# Patient Record
Sex: MALE | Race: WHITE | Employment: FULL TIME | URBAN - METROPOLITAN AREA
[De-identification: names, ages, dates, MRNs, and addresses within clinical notes are randomized per-mention and may not be internally consistent; named-entity substitution may affect disease eponyms.]

---

## 2019-10-10 ENCOUNTER — OFFICE VISIT (OUTPATIENT)
Dept: SURGERY | Age: 24
End: 2019-10-10

## 2019-10-10 VITALS
DIASTOLIC BLOOD PRESSURE: 80 MMHG | TEMPERATURE: 98.8 F | HEART RATE: 104 BPM | RESPIRATION RATE: 16 BRPM | OXYGEN SATURATION: 98 % | SYSTOLIC BLOOD PRESSURE: 122 MMHG | WEIGHT: 150 LBS

## 2019-10-10 DIAGNOSIS — R59.0 SUPRACLAVICULAR ADENOPATHY: Primary | ICD-10-CM

## 2019-10-10 RX ORDER — CEPHALEXIN 500 MG/1
CAPSULE ORAL
COMMUNITY
Start: 2019-10-04 | End: 2022-06-13

## 2019-10-10 RX ORDER — LORATADINE 10 MG/1
10 TABLET ORAL
COMMUNITY
End: 2022-06-13 | Stop reason: SDUPTHER

## 2019-10-16 NOTE — PROGRESS NOTES
[facility] History and Physical    Chief Complaint: right supraclavicular adenopathy    History of Present Illness:      Aicha Reis is a 30 y.o. male who was kindly referred from [facility] for evaluation of a right supraclavicular nodule. The patient states his wife actually noted this for the first time a couple of weeks ago. The patient had a 'cold' around that time as well. He palpates a small nodule in the right supraclavicular space along the mid clavicle. He feels like this is new and is concerned about it. He denies any fevers, night sweats or weight loss. He has seasonal allergies. No personal history of cancers. He does have a grandfather that had a lymphoma in the GI tract.       Past Medical History:   Diagnosis Date    Congestion of nasal sinus     Mouth ulcers     history of       Past Surgical History:   Procedure Laterality Date    HX HEENT      tonsils removed       Social History     Socioeconomic History    Marital status:      Spouse name: Not on file    Number of children: Not on file    Years of education: Not on file    Highest education level: Not on file   Occupational History    Not on file   Social Needs    Financial resource strain: Not on file    Food insecurity:     Worry: Not on file     Inability: Not on file    Transportation needs:     Medical: Not on file     Non-medical: Not on file   Tobacco Use    Smoking status: Former Smoker     Last attempt to quit: 1/1/2016     Years since quitting: 3.7    Smokeless tobacco: Never Used   Substance and Sexual Activity    Alcohol use: Yes     Comment: beer 1-2 per week    Drug use: Not on file    Sexual activity: Not on file   Lifestyle    Physical activity:     Days per week: Not on file     Minutes per session: Not on file    Stress: Not on file   Relationships    Social connections:     Talks on phone: Not on file     Gets together: Not on file     Attends Sabianism service: Not on file     Active member of club or organization: Not on file     Attends meetings of clubs or organizations: Not on file     Relationship status: Not on file    Intimate partner violence:     Fear of current or ex partner: Not on file     Emotionally abused: Not on file     Physically abused: Not on file     Forced sexual activity: Not on file   Other Topics Concern    Not on file   Social History Narrative    Not on file       Family History   Problem Relation Age of Onset    Hypertension Father     Diabetes Maternal Grandmother     Stroke Paternal Grandfather     Diabetes Paternal Grandfather         lung cancer, lymphoma         Current Outpatient Medications:     cephALEXin (KEFLEX) 500 mg capsule, , Disp: , Rfl:     loratadine (CLARITIN) 10 mg tablet, Take 10 mg by mouth., Disp: , Rfl:     Allergies   Allergen Reactions    Shellfish Derived Hives       ROS   Constitutional: negative  Ears, Nose, Mouth, Throat, and Face: Recent URTI now resolved. Respiratory: negative  Cardiovascular: negative  Gastrointestinal: negative  Genitourinary:negative  Integument/Breast: negative  Hematologic/Lymphatic: See HPI  Behavioral/Psychiatric: negative  Allergic/Immunologic: negative      Physical Exam:     Visit Vitals  /80 (BP 1 Location: Left arm, BP Patient Position: Sitting)   Pulse (!) 104   Temp 98.8 °F (37.1 °C) (Oral)   Resp 16   Wt 150 lb (68 kg)   SpO2 98%       General - alert and oriented, no apparent distress  HEENT - NC/AT. No scleral icterus. There is a small, ~ 1 cm palpable nodule in the right supraclavicular space that feels deep to the SCM muscle. Pulm - CTAB, normal inspiratory effort  CV - RRR, no M/R/G  Abd - soft, NT, ND. Ext - warm, well perfused, no edema  Lymphatics - no cervical, supraclavicular, axillary or inguinal adenopathy noted other than that mentioned above.     Skin - supple, no rashes  Psychiatric - normal affect, good mood    Labs  None    Imaging  None      Assessment:     Donavan Ng is a 30 y.o. male with a palpable nodule in the right supraclavicular space. Recommendations:     1. I have recommended getting a chest CT to better visualize this nodule and will make subsequent recommendations based upon that. It is hard to fully palpate as it is deep the SCM muscle and clavicle, so this may be larger and I am just feeling the top of it. I will plan to call him after the CT is completed. 25 mins of time was spent with the patient of which > 50% of the time involved face-to-face counseling of the patient regarding the proposed treatment plan.       Thania Galvez MD  10/10/2019

## 2019-10-17 ENCOUNTER — HOSPITAL ENCOUNTER (OUTPATIENT)
Dept: CT IMAGING | Age: 24
Discharge: HOME | End: 2019-10-17
Attending: SURGERY
Payer: COMMERCIAL

## 2019-10-17 DIAGNOSIS — R59.0 SUPRACLAVICULAR ADENOPATHY: ICD-10-CM

## 2019-10-17 PROCEDURE — 71260 CT THORAX DX C+: CPT

## 2019-10-17 PROCEDURE — 74011636320 HC RX REV CODE- 636/320: Performed by: SURGERY

## 2019-10-17 RX ORDER — SODIUM CHLORIDE 0.9 % (FLUSH) 0.9 %
5-10 SYRINGE (ML) INJECTION
Status: COMPLETED | OUTPATIENT
Start: 2019-10-17 | End: 2019-10-17

## 2019-10-17 RX ADMIN — IOPAMIDOL 100 ML: 612 INJECTION, SOLUTION INTRAVENOUS at 14:32

## 2019-10-17 RX ADMIN — Medication 10 ML: at 14:32

## 2022-01-26 ENCOUNTER — VIRTUAL VISIT (OUTPATIENT)
Dept: FAMILY MEDICINE CLINIC | Age: 27
End: 2022-01-26

## 2022-01-26 DIAGNOSIS — Z02.9 ADMINISTRATIVE ENCOUNTER: Primary | ICD-10-CM

## 2022-01-26 NOTE — PROGRESS NOTES
Chief Complaint   Patient presents with   [facility]     1. Have you been to the ER, urgent care clinic since your last visit? Hospitalized since your last visit? Yes, 01/23/22 [facility]. 2. Have you seen or consulted any other health care providers outside of the [address] [place] [address] since your last visit? Include any pap smears or colon screening.  No

## 2022-02-14 ENCOUNTER — VIRTUAL VISIT (OUTPATIENT)
Dept: FAMILY MEDICINE CLINIC | Age: 27
End: 2022-02-14
Payer: COMMERCIAL

## 2022-02-14 DIAGNOSIS — T50.905D ADVERSE EFFECT OF DRUG, SUBSEQUENT ENCOUNTER: ICD-10-CM

## 2022-02-14 DIAGNOSIS — R79.89 ELEVATED LIVER FUNCTION TESTS: Primary | ICD-10-CM

## 2022-02-14 DIAGNOSIS — J30.89 ENVIRONMENTAL AND SEASONAL ALLERGIES: ICD-10-CM

## 2022-02-14 DIAGNOSIS — Z13.220 SCREENING FOR LIPID DISORDERS: ICD-10-CM

## 2022-02-14 PROCEDURE — 99204 OFFICE O/P NEW MOD 45 MIN: CPT | Performed by: FAMILY MEDICINE

## 2022-02-14 NOTE — PROGRESS NOTES
Chief Complaint   Patient presents with   [facility]     1. Have you been to the ER, urgent care clinic since your last visit? Hospitalized since your last visit? Yes 01/23/22, [facility] ER, Tachycardia and dizziness. 2. Have you seen or consulted any other health care providers outside of the [address] [place] [address] since your last visit? Include any pap smears or colon screening.  No

## 2022-02-14 NOTE — PROGRESS NOTES
Matilda Cool is a 27 y.o. male who was seen by synchronous (real-time) audio-video technology on 2/14/2022. Consent: Matilda Cool, who was seen by synchronous (real-time) audio-video technology, and/or his healthcare decision maker, is aware that this patient-initiated, Telehealth encounter on 2/14/2022 is a billable service, with coverage as determined by his insurance carrier. He is aware that he may receive a bill and has provided verbal consent to proceed: Yes. Assessment & Plan:   1. Elevated liver function tests  In ER recently, may have been related to acute phase reaction, will recheck at interval  - METABOLIC PANEL, COMPREHENSIVE; Future  - METABOLIC PANEL, COMPREHENSIVE    2. Adverse effect of drug, subsequent encounter  Avoid future use of the provoking substance    3. Environmental and seasonal allergies  C/w claritin PRN    4. Screening for lipid disorders  Routine, fasting lab  - LIPID PANEL; Future  - LIPID PANEL    New patient today, history reviewed   Physical this summer  Call with concerns  Recommend covid vaccine    Pt was counseled on risks, benefits and alternatives of treatment options. All questions were asked and answered and the patient was agreeable with the treatment plan as outlined.       Subjective:   Matilda Cool is a 27 y.o. male who was seen for Establish Care      Home: house with wife, dog and cat  Work: works for [employer] as a   Last PCP: mostly been doing UC since college (Dr Logn Grandchild, [address])  Eye Doc: no  Dentist: goes regularly    Exercise: 3-4 times a week, rock climbing  Diet: eating a relatively healthy diet, mostly cooks at home, trying to limit red meat, mostly chicken and fish for protien  Weight: went up during covid  Height: 5'7-8  Weight: 145-150    Tob: no--rarely smoked hooka in college  Etoh: rarely, once every 2 week  Illicit: no    SA: one female partner  Declines STD testing    Takes Claritin in the spring--seasonal allergies    Had covid a year ago, had 2 vaccines last April    Doing PT right now for his back--has scoliosis, back pain is likely related to this, angle is about 50*  Seeing Ortho--Dr Romeo Mireles    ER visit 3 wk ago --occ takes cbd for his back, went to his usual place, got delta 8, started feeling really unwell, his HR was 180, stayed there for 2 hours, felt sob, seems it was related to delta 8        Medications, allergies, PMH, PSH, SOCH, [facility] reviewed and updated per routine protocol, see chart for review and changes if not noted here. ROS  A 12 point review of systems was negative except as noted here or in the HPI.     Objective:   Vital Signs: (As obtained by patient/caregiver at home)  Patient-Reported Vitals 2/14/2022   Patient-Reported Weight 150lb   Patient-Reported Pulse 77   Patient-Reported Systolic  638   Patient-Reported Diastolic 82        [INSTRUCTIONS:  \"[x]\" Indicates a positive item  \"[]\" Indicates a negative item  -- DELETE ALL ITEMS NOT EXAMINED]    Constitutional: [x] Appears well-developed and well-nourished [x] No apparent distress      [] Abnormal -     Mental status: [x] Alert and awake  [x] Oriented to person/place/time [x] Able to follow commands    [] Abnormal -     Eyes:   EOM    [x]  Normal    [] Abnormal -   Sclera  [x]  Normal    [] Abnormal -          Discharge [x]  None visible   [] Abnormal -     HENT: [x] Normocephalic, atraumatic  [] Abnormal -   [x] Mouth/Throat: Mucous membranes are moist    External Ears [x] Normal  [] Abnormal -    Neck: [x] No visualized mass [] Abnormal -     Pulmonary/Chest: [x] Respiratory effort normal   [x] No visualized signs of difficulty breathing or respiratory distress        [] Abnormal -      Musculoskeletal:   [] Normal gait with no signs of ataxia         [x] Normal range of motion of neck        [] Abnormal -     Neurological:        [x] No Facial Asymmetry (Cranial nerve 7 motor function) (limited exam due to video visit)          [x] No gaze palsy        [] Abnormal -          Skin:        [x] No significant exanthematous lesions or discoloration noted on facial skin         [] Abnormal -            Psychiatric:       [x] Normal Affect [] Abnormal -        [x] No Hallucinations    Other pertinent observable physical exam findings:seated no distress    We discussed the expected course, resolution and complications of the diagnosis(es) in detail. Medication risks, benefits, costs, interactions, and alternatives were discussed as indicated. I advised him to contact the office if his condition worsens, changes or fails to improve as anticipated. He expressed understanding with the diagnosis(es) and plan. Yandy Collado is a 27 y.o. male who was evaluated by a video visit encounter for concerns as above. Patient identification was verified prior to start of the visit. A caregiver was present when appropriate. Due to this being a TeleHealth encounter (During [place] [address] public [facility] emergency), evaluation of the following organ systems was limited: Vitals/Constitutional/EENT/Resp/CV/GI//MS/Neuro/Skin/Heme-Lymph-Imm. Pursuant to the emergency declaration under the [facility], [facility] waiver authority and the [employer] Act, this Virtual  Visit was conducted, with patient's (and/or legal guardian's) consent, to reduce the patient's risk of exposure to COVID-19 and provide necessary medical care. Services were provided through a video synchronous discussion virtually to substitute for in-person clinic visit. Patient and provider were located at their individual homes. Iram Branham MD  [facility]  02/14/22 8:47 AM     Portions of this note may have been populated using smart dictation software and may have \"sounds-like\" errors present.

## 2022-06-13 ENCOUNTER — OFFICE VISIT (OUTPATIENT)
Dept: FAMILY MEDICINE CLINIC | Age: 27
End: 2022-06-13
Payer: COMMERCIAL

## 2022-06-13 VITALS
WEIGHT: 156 LBS | RESPIRATION RATE: 16 BRPM | OXYGEN SATURATION: 98 % | BODY MASS INDEX: 23.64 KG/M2 | HEIGHT: 68 IN | HEART RATE: 88 BPM | TEMPERATURE: 97 F | DIASTOLIC BLOOD PRESSURE: 89 MMHG | SYSTOLIC BLOOD PRESSURE: 137 MMHG

## 2022-06-13 DIAGNOSIS — S20.361A INSECT BITE OF CHEST, RIGHT, INITIAL ENCOUNTER: Primary | ICD-10-CM
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
## 2022-06-13 PROCEDURE — 99213 OFFICE O/P EST LOW 20 MIN: CPT | Performed by: FAMILY MEDICINE

## 2022-06-13 RX ORDER — LORATADINE 10 MG/1
10 TABLET ORAL
COMMUNITY
Start: 2022-06-13

## 2022-06-13 NOTE — PROGRESS NOTES
Chief Complaint   Patient presents with    Insect Bite     4 days ago a couple bites hx of lymes dz.

## 2022-06-13 NOTE — PROGRESS NOTES
HISTORY OF PRESENT ILLNESS  Bruce Stanton is a 32 y.o. male. Patient presents with: Insect Bite: 4 days ago a couple bites hx of lymes dz. He got multiple bug bites last week and all of them have gone away except for one on his chest that is getting bigger. Hydrocortisone cream helps. It is not painful. Review of Systems   Constitutional: Negative for chills and fever. Skin: Negative for itching and rash. Visit Vitals  /89   Pulse 88   Temp 97 °F (36.1 °C) (Temporal)   Resp 16   Ht 5' 8\" (1.727 m)   Wt 156 lb (70.8 kg)   SpO2 98%   BMI 23.72 kg/m²     Physical Exam  Constitutional:       General: He is not in acute distress. Appearance: Normal appearance. Chest:       Neurological:      Mental Status: He is alert and oriented to person, place, and time. ASSESSMENT and PLAN    ICD-10-CM ICD-9-CM    1. Insect bite of chest, right, initial encounter  S20.361A 911.4 loratadine (Claritin) 10 mg tablet    [address]. [place] E906.4         Area breaking up  Ice  Claritin prn  Use insect spray in the future    Follow-up and Dispositions    · Return if symptoms worsen or fail to improve. Reviewed plan of care. Patient has provided input and agrees with goals.

## 2022-09-02 ENCOUNTER — TELEPHONE (OUTPATIENT)
Dept: FAMILY MEDICINE CLINIC | Age: 27
End: 2022-09-02

## 2022-09-02 NOTE — TELEPHONE ENCOUNTER
Do you want to book an apt or would you prefer they see dental?    Criss Hernandez [facility]  Subject: Message to Provider     QUESTIONS   Information for Provider? Patient had covid in July, and then the flu   immediately following, and since then he has had a white film in his   mouth, that has not completely gone a way, with a little bit of bleeding   upon brushing his tongue. He believes it may be thrush, and would like to   see Dr. Tosha Shaffer, or talk to her about what he should do. He is requesting   a call back.    ---------------------------------------------------------------------------   --------------   Ever Atwood [facility]   [phone]; OK to leave message on voicemail

## 2022-09-08 ENCOUNTER — OFFICE VISIT (OUTPATIENT)
Dept: FAMILY MEDICINE CLINIC | Age: 27
End: 2022-09-08
Payer: COMMERCIAL

## 2022-09-08 VITALS
TEMPERATURE: 98.4 F | DIASTOLIC BLOOD PRESSURE: 80 MMHG | HEIGHT: 68 IN | OXYGEN SATURATION: 98 % | BODY MASS INDEX: 21.82 KG/M2 | SYSTOLIC BLOOD PRESSURE: 138 MMHG | WEIGHT: 144 LBS | HEART RATE: 100 BPM | RESPIRATION RATE: 20 BRPM

## 2022-09-08 DIAGNOSIS — R63.4 UNINTENDED WEIGHT LOSS: ICD-10-CM

## 2022-09-08 DIAGNOSIS — Z11.3 SCREEN FOR STD (SEXUALLY TRANSMITTED DISEASE): ICD-10-CM

## 2022-09-08 DIAGNOSIS — Z13.220 SCREENING FOR LIPID DISORDERS: ICD-10-CM

## 2022-09-08 DIAGNOSIS — K14.1 GEOGRAPHIC TONGUE: Primary | ICD-10-CM

## 2022-09-08 DIAGNOSIS — R79.89 ELEVATED LIVER FUNCTION TESTS: ICD-10-CM

## 2022-09-08 PROCEDURE — 99214 OFFICE O/P EST MOD 30 MIN: CPT | Performed by: FAMILY MEDICINE

## 2022-09-08 NOTE — PROGRESS NOTES
Chief Complaint   Patient presents with    [address]- did have COVID 07/2022 and had white film on tongue      Per pt, he went to allergist and he is not allergic to Shellfish

## 2022-09-08 NOTE — PROGRESS NOTES
Family Medicine Follow-Up Progress Note  Patient: Harpal Rosario  1995, 32 y.o., male  Encounter Date: 9/8/2022    ASSESSMENT & PLAN    ICD-10-CM ICD-9-CM    1. Geographic tongue  K14.1 529.1       2. Screening for lipid disorders  Z13.220 V77.91 LIPID PANEL      3. Elevated liver function tests  K19.35 111.4 METABOLIC PANEL, COMPREHENSIVE      4. Screen for STD (sexually transmitted disease)  Z11.3 V74.5 HIV 1/2 AG/AB, 4TH GENERATION,W RFLX CONFIRM      T PALLIDUM SCREEN W/REFLEX      HEP B SURFACE AG      HEPATITIS C AB      CT/NG/T.VAGINALIS AMPLIFICATION      5. Unintended weight loss  R63.4 783.21 THYROID CASCADE PROFILE          Orders Placed This Encounter    METABOLIC PANEL, COMPREHENSIVE     Standing Status:   Future     Standing Expiration Date:   9/8/2023    LIPID PANEL     Standing Status:   Future     Standing Expiration Date:   9/8/2023    HIV SCREEN, 4TH GEN. W/REFLEX CONFIRM     Standing Status:   Future     Standing Expiration Date:   9/8/2023    T PALLIDUM SCREEN W/REFLEX     Standing Status:   Future     Standing Expiration Date:   9/8/2023    HEP B SURFACE AG     Standing Status:   Future     Standing Expiration Date:   9/8/2023    HEPATITIS C AB     Standing Status:   Future     Standing Expiration Date:   9/8/2023    CT/NG/T.VAGINALIS AMPLIFICATION     Standing Status:   Future     Standing Expiration Date:   9/8/2023     Order Specific Question:   Specimen source     Answer:   Urine [258]    THYROID CASCADE PROFILE     Standing Status:   Future     Standing Expiration Date:   9/8/2023         There are no Patient Instructions on file for this visit.     CHIEF COMPLAINT  Chief Complaint   Patient presents with    Francyne Balo possible [place]- did have COVID 07/2022 and had white film on tongue        SUBJECTIVE  Harpal Rosario is a 32 y.o. male presenting today forfollow up    Wt Readings from Last 3 Encounters:   09/08/22 144 lb (65.3 kg)   06/13/22 156 lb (70.8 kg)   10/10/19 150 lb (68 kg)     Some unintended weight loss since covid can't seem to get it back on, lost rapidly    Tells me also he's worried about his tongue  Uc gave nystatin mouthwash, not successful for treatment    Good dental hygiene    Fhx dm--mom, grandma, uncle        ROS  Review of Systems  A 12 point review of systems was negative except as noted here or in the HPI. OBJECTIVE  Visit Vitals  /80   Pulse 100   Temp 98.4 °F (36.9 °C) (Temporal)   Resp 20   Ht 5' 8\" (1.727 m)   Wt 144 lb (65.3 kg)   SpO2 98%   BMI 21.90 kg/m²       Physical Exam  Vitals reviewed. Constitutional:       General: He is not in acute distress. Appearance: Normal appearance. He is normal weight. He is not ill-appearing, toxic-appearing or diaphoretic. HENT:      Head: Normocephalic and atraumatic. Right Ear: External ear normal.      Left Ear: External ear normal.      Mouth/Throat:      Mouth: Mucous membranes are moist.      Comments: Geographic tongue  Eyes:      General: No scleral icterus. Cardiovascular:      Rate and Rhythm: Normal rate and regular rhythm. Heart sounds: No friction rub. No gallop. Pulmonary:      Effort: Pulmonary effort is normal. No respiratory distress. Breath sounds: No stridor. No wheezing. Musculoskeletal:      Right lower leg: No edema. Left lower leg: No edema. Skin:     Coloration: Skin is not jaundiced or pale. Findings: No bruising, erythema, lesion or rash. Neurological:      General: No focal deficit present. Mental Status: He is alert. Cranial Nerves: No cranial nerve deficit. Gait: Gait normal.   Psychiatric:         Mood and Affect: Mood normal.         Behavior: Behavior normal.         Thought Content: Thought content normal.         Judgment: Judgment normal.       No results found for any visits on 09/08/22.     HISTORICAL  Reviewed and updated today, and as noted below:    Past Medical History:   Diagnosis Date    Congestion of nasal sinus     Mouth ulcers     history of    Scoliosis     Scoliosis      Past Surgical History:   Procedure Laterality Date    HX HEENT      tonsils removed     Family History   Problem Relation Age of Onset    Diabetes Mother     Hypertension Father     Diabetes Maternal Grandmother     Stroke Paternal Grandfather     Diabetes Paternal Grandfather         lung cancer, lymphoma     Social History     Tobacco Use   Smoking Status Former    Types: Cigarettes    Quit date: 2016    Years since quittin.6   Smokeless Tobacco Never     Social History     Socioeconomic History    Marital status:    Tobacco Use    Smoking status: Former     Types: Cigarettes     Quit date: 2016     Years since quittin.6    Smokeless tobacco: Never   Vaping Use    Vaping Use: Never used   Substance and Sexual Activity    Alcohol use: Yes     Comment: beer 1-2 per week    Drug use: Not Currently     Allergies   Allergen Reactions    Shellfish Derived Hives       LAB REVIEW  No results found for: NA, K, CL, CO2, AGAP, GLU, BUN, CREA, BUCR, GFRAA, GFRNA, CA, TBIL, TBILI, AP, TP, ALB, GLOB, AGRAT, ALT  No results found for: WBC, WBCLT, HGBPOC, HGB, HGBP, HCTPOC, HCT, PHCT, RBCH, PLT, MCV, HGBEXT, HCTEXT, PLTEXT, HGBEXT, HCTEXT, PLTEXT  No results found for: HBA1C, FDU6ZPDY, LDC3AHSO, QLF4XUFH  No results found for: CHOL, CHOLPOCT, CHOLX, CHLST, CHOLV, HDL, HDLPOC, HDLP, LDL, LDLCPOC, LDLC, DLDLP, VLDLC, VLDL, TGLX, TRIGL, TRIGP, TGLPOCT, CHHD, CHHDX        Ej Joe MD  [facility]  22 4:00 PM    Portions of this note may have been populated using smart dictation software and may have \"sounds-like\" errors present. Pt was counseled on risks, benefits and alternatives of treatment options. All questions were asked and answered and the patient was agreeable with the treatment plan as outlined.

## 2022-09-09 LAB
ALBUMIN SERPL-MCNC: 4.5 G/DL (ref 3.5–5)
ALBUMIN/GLOB SERPL: 1.4 {RATIO} (ref 1.1–2.2)
ALP SERPL-CCNC: 100 U/L (ref 45–117)
ALT SERPL-CCNC: 21 U/L (ref 12–78)
ANION GAP SERPL CALC-SCNC: 5 MMOL/L (ref 5–15)
AST SERPL-CCNC: 14 U/L (ref 15–37)
BILIRUB SERPL-MCNC: 0.8 MG/DL (ref 0.2–1)
BUN SERPL-MCNC: 14 MG/DL (ref 6–20)
BUN/CREAT SERPL: 13 (ref 12–20)
CALCIUM SERPL-MCNC: 9.1 MG/DL (ref 8.5–10.1)
CHLORIDE SERPL-SCNC: 103 MMOL/L (ref 97–108)
CHOLEST SERPL-MCNC: 192 MG/DL
CO2 SERPL-SCNC: 28 MMOL/L (ref 21–32)
CREAT SERPL-MCNC: 1.12 MG/DL (ref 0.7–1.3)
GLOBULIN SER CALC-MCNC: 3.2 G/DL (ref 2–4)
GLUCOSE SERPL-MCNC: 71 MG/DL (ref 65–100)
HBV SURFACE AG SER QL: <0.1 INDEX
HBV SURFACE AG SER QL: NEGATIVE
HCV AB SERPL QL IA: NONREACTIVE
HDLC SERPL-MCNC: 47 MG/DL
HDLC SERPL: 4.1 {RATIO} (ref 0–5)
HIV 1+2 AB+HIV1 P24 AG SERPL QL IA: NONREACTIVE
HIV12 RESULT COMMENT, HHIVC: NORMAL
LDLC SERPL CALC-MCNC: 130.2 MG/DL (ref 0–100)
POTASSIUM SERPL-SCNC: 4.6 MMOL/L (ref 3.5–5.1)
PROT SERPL-MCNC: 7.7 G/DL (ref 6.4–8.2)
SODIUM SERPL-SCNC: 136 MMOL/L (ref 136–145)
TRIGL SERPL-MCNC: 74 MG/DL (ref ?–150)
VLDLC SERPL CALC-MCNC: 14.8 MG/DL

## 2022-09-10 LAB — TSH SERPL-ACNC: 0.97 UIU/ML (ref 0.45–4.5)

## 2022-09-12 LAB
C TRACH RRNA SPEC QL NAA+PROBE: NEGATIVE
N GONORRHOEA RRNA SPEC QL NAA+PROBE: NEGATIVE
SPECIMEN SOURCE: NORMAL
T PALLIDUM AB SER QL IA: NON REACTIVE
T VAGINALIS RRNA SPEC QL NAA+PROBE: NEGATIVE

## 2022-12-27 ENCOUNTER — OFFICE VISIT (OUTPATIENT)
Dept: FAMILY MEDICINE CLINIC | Age: 27
End: 2022-12-27
Payer: COMMERCIAL

## 2022-12-27 VITALS
TEMPERATURE: 98.3 F | HEART RATE: 85 BPM | RESPIRATION RATE: 16 BRPM | WEIGHT: 150 LBS | SYSTOLIC BLOOD PRESSURE: 138 MMHG | DIASTOLIC BLOOD PRESSURE: 78 MMHG | BODY MASS INDEX: 25.61 KG/M2 | HEIGHT: 64 IN | OXYGEN SATURATION: 97 %

## 2022-12-27 DIAGNOSIS — E78.00 PURE HYPERCHOLESTEROLEMIA: ICD-10-CM

## 2022-12-27 DIAGNOSIS — Z00.00 ROUTINE MEDICAL EXAM: Primary | ICD-10-CM

## 2022-12-27 PROCEDURE — 99395 PREV VISIT EST AGE 18-39: CPT | Performed by: FAMILY MEDICINE

## 2022-12-27 NOTE — PROGRESS NOTES
Subjective:   Shahla Esposito is a 32 y.o. male presenting for his annual checkup. ROS:  Feeling well. No dyspnea or chest pain on exertion. No abdominal pain, change in bowel habits, black or bloody stools. No urinary tract or prostatic symptoms. No neurological complaints. Specific concerns today: none, he's doing well healthy  Holidays a little stressful--he says bp goes up for stress on average at home when he checks he's seeing 130s/80s    Some increased acid reflux after the stomach bug  Took pepto    Declines std testing  One female pattern    No tob  Rare etoh  No illicit    Exercise: rock climbing 3x a week  Trying to run from time to time      Lab Results   Component Value Date/Time    Cholesterol, total 192 2022 04:18 PM    HDL Cholesterol 47 2022 04:18 PM    LDL, calculated 130.2 (H) 2022 04:18 PM    VLDL, calculated 14.8 2022 04:18 PM    Triglyceride 74 2022 04:18 PM    CHOL/HDL Ratio 4.1 2022 04:18 PM     .  Wt Readings from Last 3 Encounters:   22 150 lb (68 kg)   22 144 lb (65.3 kg)   22 156 lb (70.8 kg)       There is no problem list on file for this patient.   Past Medical History:   Diagnosis Date    Congestion of nasal sinus     Mouth ulcers     history of    Scoliosis     Scoliosis      Past Surgical History:   Procedure Laterality Date    HX HEENT      tonsils removed     Family History   Problem Relation Age of Onset    Diabetes Mother     Hypertension Father     Diabetes Maternal Grandmother     Stroke Paternal Grandfather     Diabetes Paternal Grandfather         lung cancer, lymphoma     Social History     Tobacco Use    Smoking status: Former     Types: Cigarettes     Quit date: 2016     Years since quittin.9    Smokeless tobacco: Never   Substance Use Topics    Alcohol use: Yes     Comment: beer 1-2 per week        No results found for: WBC, WBCT, WBCPOC, HGB, HGBPOC, HCT, HCTPOC, PLT, PLTPOC, MCV, MCVPOC, HGBEXT, HCTEXT, PLTEXT, HGBEXT, HCTEXT, PLTEXT  Lab Results   Component Value Date/Time    Cholesterol, total 192 09/08/2022 04:18 PM    HDL Cholesterol 47 09/08/2022 04:18 PM    LDL, calculated 130.2 (H) 09/08/2022 04:18 PM    Triglyceride 74 09/08/2022 04:18 PM    CHOL/HDL Ratio 4.1 09/08/2022 04:18 PM     Lab Results   Component Value Date/Time    TSH 0.969 09/08/2022 04:18 PM      Lab Results   Component Value Date/Time    Sodium 136 09/08/2022 04:18 PM    Potassium 4.6 09/08/2022 04:18 PM    Chloride 103 09/08/2022 04:18 PM    CO2 28 09/08/2022 04:18 PM    Anion gap 5 09/08/2022 04:18 PM    Glucose 71 09/08/2022 04:18 PM    BUN 14 09/08/2022 04:18 PM    Creatinine 1.12 09/08/2022 04:18 PM    BUN/Creatinine ratio 13 09/08/2022 04:18 PM    GFR est AA >60 09/08/2022 04:18 PM    GFR est non-AA >60 09/08/2022 04:18 PM    Calcium 9.1 09/08/2022 04:18 PM    Bilirubin, total 0.8 09/08/2022 04:18 PM    ALT (SGPT) 21 09/08/2022 04:18 PM    Alk. phosphatase 100 09/08/2022 04:18 PM    Protein, total 7.7 09/08/2022 04:18 PM    Albumin 4.5 09/08/2022 04:18 PM    Globulin 3.2 09/08/2022 04:18 PM    A-G Ratio 1.4 09/08/2022 04:18 PM         Objective:     Visit Vitals  /78   Pulse 85   Temp 98.3 °F (36.8 °C)   Resp 16   Ht 5' 4\" (1.626 m)   Wt 150 lb (68 kg)   SpO2 97%   BMI 25.75 kg/m²     The patient appears well, alert, oriented x 3, in no distress. ENT normal.  Neck supple. No adenopathy or thyromegaly. JOHN. Lungs are clear, good air entry, no wheezes, rhonchi or rales. S1 and S2 normal, no murmurs, regular rate and rhythm. Abdomen is soft without tenderness, guarding, mass or organomegaly.  exam: deferred no concerns  . Extremities show no edema, normal peripheral pulses. Neurological is normal without focal findings. Assessment/Plan:   healthy adult male  increase physical activity, continue present diet with no restrictions, call if any problems, labs reviewed from the fall do not need to be repeated today.     ICD-10-CM ICD-9-CM    1. Routine medical exam  Z00.00 V70.0       2.  Pure hypercholesterolemia  E78.00 272.0       High fiber healthy diet  Add more cardio to exercise  Repeat labs NExT YEAR

## 2023-04-26 ENCOUNTER — OFFICE VISIT (OUTPATIENT)
Dept: FAMILY MEDICINE CLINIC | Age: 28
End: 2023-04-26
Payer: COMMERCIAL

## 2023-04-26 VITALS
HEART RATE: 76 BPM | WEIGHT: 150 LBS | SYSTOLIC BLOOD PRESSURE: 121 MMHG | RESPIRATION RATE: 20 BRPM | DIASTOLIC BLOOD PRESSURE: 84 MMHG | BODY MASS INDEX: 25.61 KG/M2 | OXYGEN SATURATION: 98 % | HEIGHT: 64 IN | TEMPERATURE: 97.7 F

## 2023-04-26 DIAGNOSIS — R35.0 URINARY FREQUENCY: Primary | ICD-10-CM

## 2023-04-26 LAB
BILIRUB UR QL STRIP: NEGATIVE
GLUCOSE UR-MCNC: NEGATIVE MG/DL
KETONES P FAST UR STRIP-MCNC: NEGATIVE MG/DL
PH UR STRIP: 6.5 [PH] (ref 4.6–8)
PROT UR QL STRIP: NEGATIVE
SP GR UR STRIP: 1.02 (ref 1–1.03)
UA UROBILINOGEN AMB POC: NORMAL (ref 0.2–1)
URINALYSIS CLARITY POC: CLEAR
URINALYSIS COLOR POC: NORMAL
URINE BLOOD POC: NEGATIVE
URINE LEUKOCYTES POC: NEGATIVE
URINE NITRITES POC: NEGATIVE

## 2023-04-26 PROCEDURE — 99213 OFFICE O/P EST LOW 20 MIN: CPT | Performed by: FAMILY MEDICINE

## 2023-04-26 PROCEDURE — 81003 URINALYSIS AUTO W/O SCOPE: CPT | Performed by: FAMILY MEDICINE

## 2023-04-26 NOTE — PROGRESS NOTES
Ashleigh Plata (: 1995) is a 25 y.o. male, established patient, here for evaluation of the following chief complaint(s):  Urinary Frequency (X 3 weeks, no pain or burning )       ASSESSMENT/PLAN:  Below is the assessment and plan developed based on review of pertinent history, physical exam, labs, studies, and medications. 1. Urinary frequency  Assessment & Plan:   unremarkable UA, unremarkable exam. history of heavy tea, water, and carbonatd beverages, likely contributing to the mild increased urinary frequency. no evidence of retention, prostate or bladder concerns. Recommend adjusting fluid intake, and return if any new associated symptoms. Patient reassured. Orders:  -     AMB POC URINALYSIS DIP STICK AUTO W/O MICRO      No follow-ups on file. SUBJECTIVE/OBJECTIVE:  HPI  Chronic Conditions Addressed Today       1. Urinary frequency - Primary     Overview      A couple weeks of increased urinary frequency (from 5-6 times per day to 8 or so times per day). Started around the time of changing over from daily sodas to daily energy drinks. Thought it was all of the caffeine and so stopped energy drinks as well after 1 week. However, the increased frequency continued. Is drinking more herbal/non-caffeinated tea, Bubbly's (carbonated beverages), and water in replacement    Associated symptoms:   No dysuria, hematuria, nocturia, incomplete voiding sensation, weak stream. NO rectal pressure of pain, no constipation, diarrhea. No painful ejaculation. No family history of early prostate cancer. Father did state that he had prostatitis a couple of times, so patient is sensitive to thoughts of prostatitis. Review of Systems  As per HPI, otherwise comprehensive ROS negative.     Physical Exam  Visit Vitals  /84   Pulse 76   Temp 97.7 °F (36.5 °C) (Temporal)   Resp 20   Ht 5' 4\" (1.626 m)   Wt 150 lb (68 kg)   SpO2 98%   BMI 25.75 kg/m²     Constitutional: well-appearing, no acute distress  Eyes: EOM intact. PERRL bilateral. Not icteric. Cardiac: normal rate, regular rhythm. Pulm: normal rate, normal effort. Skin: normal color and turgor. Lower extrem: no edema. Abd: flat, soft, nontender, nondistended. : normal rectal and prostate exam. No tenderness to prostate, normal in size and consistency. Not boggy or painful. An electronic signature was used to authenticate this note.   -- Lilia Carrington MD

## 2023-04-26 NOTE — ASSESSMENT & PLAN NOTE
unremarkable UA, unremarkable exam. history of heavy tea, water, and carbonatd beverages, likely contributing to the mild increased urinary frequency. no evidence of retention, prostate or bladder concerns. Recommend adjusting fluid intake, and return if any new associated symptoms. Patient reassured.

## 2023-05-08 ENCOUNTER — TELEMEDICINE (OUTPATIENT)
Facility: CLINIC | Age: 28
End: 2023-05-08
Payer: COMMERCIAL

## 2023-05-08 DIAGNOSIS — F43.9 SITUATIONAL STRESS: Primary | ICD-10-CM

## 2023-05-08 DIAGNOSIS — R35.0 URINARY FREQUENCY: ICD-10-CM

## 2023-05-08 PROCEDURE — 99214 OFFICE O/P EST MOD 30 MIN: CPT | Performed by: FAMILY MEDICINE

## 2023-05-08 RX ORDER — BUSPIRONE HYDROCHLORIDE 10 MG/1
10 TABLET ORAL 3 TIMES DAILY
Qty: 90 TABLET | Refills: 3 | Status: SHIPPED | OUTPATIENT
Start: 2023-05-08

## 2023-05-08 RX ORDER — LORATADINE 10 MG/1
10 TABLET ORAL DAILY PRN
COMMUNITY
Start: 2022-06-13

## 2023-05-08 RX ORDER — IBUPROFEN 800 MG/1
TABLET ORAL
COMMUNITY

## 2023-05-08 SDOH — ECONOMIC STABILITY: FOOD INSECURITY: WITHIN THE PAST 12 MONTHS, YOU WORRIED THAT YOUR FOOD WOULD RUN OUT BEFORE YOU GOT MONEY TO BUY MORE.: NEVER TRUE

## 2023-05-08 SDOH — ECONOMIC STABILITY: INCOME INSECURITY: HOW HARD IS IT FOR YOU TO PAY FOR THE VERY BASICS LIKE FOOD, HOUSING, MEDICAL CARE, AND HEATING?: NOT HARD AT ALL

## 2023-05-08 SDOH — ECONOMIC STABILITY: TRANSPORTATION INSECURITY
IN THE PAST 12 MONTHS, HAS LACK OF TRANSPORTATION KEPT YOU FROM MEETINGS, WORK, OR FROM GETTING THINGS NEEDED FOR DAILY LIVING?: NO

## 2023-05-08 SDOH — ECONOMIC STABILITY: HOUSING INSECURITY
IN THE LAST 12 MONTHS, WAS THERE A TIME WHEN YOU DID NOT HAVE A STEADY PLACE TO SLEEP OR SLEPT IN A SHELTER (INCLUDING NOW)?: NO

## 2023-05-08 SDOH — ECONOMIC STABILITY: FOOD INSECURITY: WITHIN THE PAST 12 MONTHS, THE FOOD YOU BOUGHT JUST DIDN'T LAST AND YOU DIDN'T HAVE MONEY TO GET MORE.: NEVER TRUE

## 2023-05-08 ASSESSMENT — PATIENT HEALTH QUESTIONNAIRE - PHQ9
SUM OF ALL RESPONSES TO PHQ QUESTIONS 1-9: 0
2. FEELING DOWN, DEPRESSED OR HOPELESS: 0
SUM OF ALL RESPONSES TO PHQ9 QUESTIONS 1 & 2: 0
SUM OF ALL RESPONSES TO PHQ QUESTIONS 1-9: 0
1. LITTLE INTEREST OR PLEASURE IN DOING THINGS: 0

## 2023-05-08 NOTE — PROGRESS NOTES
Chief Complaint   Patient presents with    Urinary Frequency     Last urine was 04/26/2023- has resolved     Stress     Follow up
condition worsens, changes or fails to improve as anticipated. He expressed understanding with the diagnosis(es) and plan. Elvin Flores is a 25 y.o. male who was evaluated by a video visit encounter for concerns as above. Patient identification was verified prior to start of the visit. A caregiver was present when appropriate. Due to this being a TeleHealth encounter ([facility]), evaluation of the following organ systems was limited: Vitals/Constitutional/EENT/Resp/CV/GI//MS/Neuro/Skin/Heme-Lymph-Imm. Pursuant to the emergency declaration under the [facility], 1135 waiver authority and the [employer] Act, this Virtual  Visit was conducted, with patient's (and/or legal guardian's) consent, to reduce the patient's risk of exposure to COVID-19 and provide necessary medical care. Services were provided through a video synchronous discussion virtually to substitute for in-person clinic visit. Patient and provider were located at their individual homes. Octavio Bess MD  [facility]  05/08/23 2:50 PM     Portions of this note may have been populated using smart dictation software and may have \"sounds-like\" errors present.

## 2023-07-10 ENCOUNTER — TELEMEDICINE (OUTPATIENT)
Facility: CLINIC | Age: 28
End: 2023-07-10
Payer: COMMERCIAL

## 2023-07-10 DIAGNOSIS — F43.9 SITUATIONAL STRESS: ICD-10-CM

## 2023-07-10 DIAGNOSIS — R19.4 CHANGE IN BOWEL HABITS: Primary | ICD-10-CM

## 2023-07-10 PROCEDURE — 99213 OFFICE O/P EST LOW 20 MIN: CPT | Performed by: FAMILY MEDICINE

## 2023-07-10 RX ORDER — BUSPIRONE HYDROCHLORIDE 5 MG/1
5 TABLET ORAL 3 TIMES DAILY PRN
Qty: 90 TABLET | Refills: 1 | Status: SHIPPED
Start: 2023-07-10

## 2023-07-10 NOTE — PROGRESS NOTES
Lit Dalton is a 25 y.o. male who was seen by synchronous (real-time) audio-video technology on 7/10/2023. Consent: Lit Dalton, who was seen by synchronous (real-time) audio-video technology, and/or his healthcare decision maker, is aware that this patient-initiated, Telehealth encounter on 7/10/2023 is a billable service, with coverage as determined by his insurance carrier. He is aware that he may receive a bill and has provided verbal consent to proceed: Yes. Assessment & Plan:   1. Situational stress  SE with 10 mg  Down to 5mg is control w/o SE for patinet  Generally taking once a day and has sufficient success  For nwo continue  Keep fu in December  If needing to readdress sooner, let me know  - busPIRone (BUSPAR) 5 MG tablet; Take 1 tablet by mouth 3 times daily as needed (anxiety)  Dispense: 90 tablet; Refill: 1    2. Change in bowel habits  Likely related to situation (travel, change in eating/drinking/exercise)--since this is improved would watch  High fiber diet including water and regular exercise   If persistent, let me know           Pt was counseled on risks, benefits and alternatives of treatment options. All questions were asked and answered and the patient was agreeable with the treatment plan as outlined. Subjective:   Lit Dalton is a 25 y.o. male who was seen for Discuss Medications      Bupsar 10 mg made him feel a little \"weird\" and he has had good response with 5mg once daily  He says side effects were floaty/spacey--though he felt less stress he felt less able to focus    Recently had some constipation, suspects it was related to a change in circumstance (travel and vacation)    Allergies--they have been terrible for a few days--had trouble in [place]      Medications, allergies, PMH, PSH, SOCH, YOGESH CRAWFORD OF [facility] reviewed and updated per routine protocol, see chart for review and changes if not noted here.     Review of Systems    A 12 point review of systems was negative except as

## 2023-09-07 ENCOUNTER — OFFICE VISIT (OUTPATIENT)
Facility: CLINIC | Age: 28
End: 2023-09-07
Payer: COMMERCIAL

## 2023-09-07 ENCOUNTER — NURSE TRIAGE (OUTPATIENT)
Dept: OTHER | Facility: CLINIC | Age: 28
End: 2023-09-07

## 2023-09-07 VITALS
WEIGHT: 153 LBS | OXYGEN SATURATION: 98 % | RESPIRATION RATE: 16 BRPM | DIASTOLIC BLOOD PRESSURE: 89 MMHG | SYSTOLIC BLOOD PRESSURE: 111 MMHG | BODY MASS INDEX: 24.01 KG/M2 | HEART RATE: 80 BPM | HEIGHT: 67 IN

## 2023-09-07 DIAGNOSIS — M65.4 DE QUERVAIN'S TENOSYNOVITIS, LEFT: Primary | ICD-10-CM

## 2023-09-07 PROBLEM — R35.0 URINARY FREQUENCY: Status: RESOLVED | Noted: 2023-04-26 | Resolved: 2023-09-07

## 2023-09-07 PROBLEM — M79.644 PAIN OF RIGHT THUMB: Status: ACTIVE | Noted: 2023-09-07

## 2023-09-07 PROCEDURE — 99213 OFFICE O/P EST LOW 20 MIN: CPT | Performed by: FAMILY MEDICINE

## 2023-09-07 ASSESSMENT — PATIENT HEALTH QUESTIONNAIRE - PHQ9
SUM OF ALL RESPONSES TO PHQ9 QUESTIONS 1 & 2: 0
SUM OF ALL RESPONSES TO PHQ QUESTIONS 1-9: 0
1. LITTLE INTEREST OR PLEASURE IN DOING THINGS: 0
SUM OF ALL RESPONSES TO PHQ QUESTIONS 1-9: 0
2. FEELING DOWN, DEPRESSED OR HOPELESS: 0
SUM OF ALL RESPONSES TO PHQ QUESTIONS 1-9: 0
SUM OF ALL RESPONSES TO PHQ QUESTIONS 1-9: 0

## 2023-09-07 NOTE — TELEPHONE ENCOUNTER
[facility] [place] [facility]    Received call from [place] [employer] at [facility] with [facility]. Subjective: Caller states \"having new tingling in left thumb, also hand pain\"     Current Symptoms: About a week ago started with pain n left thumb. Pain will radiate to palm in the evening. Will get tingling occasionally. No known injury  Maybe a little swelling  Denies redness  Will hurt more when using hand. Onset: 1 week ago; gradual, worsening    Associated Symptoms: NA    Pain Severity: 3/10    Temperature: denies     What has been tried: ibuprofen, heat, wrist brace    LMP: NA Pregnant: NA    Recommended disposition: See PCP within 3 Days    Care advice provided, patient verbalizes understanding; denies any other questions or concerns; instructed to call back for any new or worsening symptoms. Patient/Caller agrees with recommended disposition; writer provided warm transfer to [employer] at [facility] for appointment scheduling    Attention Provider: Thank you for allowing me to participate in the care of your patient. The patient was connected to triage in response to information provided to the ECC/PSC. Please do not respond through this encounter as the response is not directed to a shared pool.       Reason for Disposition   MODERATE pain (e.g., interferes with normal activities) and present > 3 days    Protocols used: Finger Pain-ADULT-[place]

## 2023-09-07 NOTE — ASSESSMENT & PLAN NOTE
explained most likely diagnosis, ruled out snuff box tenderness and CMC joint with grind test. Recommend conservative cares and if not better in 2 weeks can return for steroid injection into tendon sheath.

## 2023-09-07 NOTE — PROGRESS NOTES
Chief Complaint   Patient presents with    Hand Pain     1st digit r hand pn comes and goes no aggravating or relieving factors

## 2023-09-21 ENCOUNTER — OFFICE VISIT (OUTPATIENT)
Facility: CLINIC | Age: 28
End: 2023-09-21
Payer: COMMERCIAL

## 2023-09-21 VITALS
OXYGEN SATURATION: 98 % | DIASTOLIC BLOOD PRESSURE: 87 MMHG | HEIGHT: 67 IN | TEMPERATURE: 97.2 F | RESPIRATION RATE: 20 BRPM | HEART RATE: 83 BPM | SYSTOLIC BLOOD PRESSURE: 121 MMHG | WEIGHT: 152 LBS | BODY MASS INDEX: 23.86 KG/M2

## 2023-09-21 DIAGNOSIS — M24.80 GENERALIZED HYPERMOBILITY OF JOINTS: ICD-10-CM

## 2023-09-21 DIAGNOSIS — M02.9: Primary | ICD-10-CM

## 2023-09-21 PROCEDURE — 99214 OFFICE O/P EST MOD 30 MIN: CPT | Performed by: FAMILY MEDICINE

## 2023-09-21 ASSESSMENT — PATIENT HEALTH QUESTIONNAIRE - PHQ9
SUM OF ALL RESPONSES TO PHQ QUESTIONS 1-9: 0
SUM OF ALL RESPONSES TO PHQ QUESTIONS 1-9: 0
2. FEELING DOWN, DEPRESSED OR HOPELESS: 0
SUM OF ALL RESPONSES TO PHQ9 QUESTIONS 1 & 2: 0
SUM OF ALL RESPONSES TO PHQ QUESTIONS 1-9: 0
1. LITTLE INTEREST OR PLEASURE IN DOING THINGS: 0
SUM OF ALL RESPONSES TO PHQ QUESTIONS 1-9: 0

## 2023-09-21 NOTE — PROGRESS NOTES
ASSESSMENT/PLAN:  Mr. Alberto Rowe is a 25 y.o. male established patient who presents for Follow-up (Fu 1st digit pn chronic in nature hs resolved some with use of splint /)  , found to have the followin. Reactive arthropathy of first metatarsophalangeal joint [facility])  Assessment & Plan:  Greatly improved. Continue cares for an additional week, incorporate heat and then gradually wean off of splint over time and re-engage activity as tolerated. Likely needs to be careful due to hx of joint hypermobility. If not improving, then will need xray and occupational therapy, and could consider advanced imaging and ortho if not improving thereafter. 2. Generalized hypermobility of joints  Assessment & Plan:  I wonder if this has played a role in frequent joint injuries. It like has played a role in the multi tendon/ligamentous injuries seen for visit reason. We will make note of this for future interactions; recommend patient be extra vigilant with protecting joints given family hx of osteoarthritis. Negative ROS for cardiovascular abnormalities, Marfan's, EDS. It was a pleasure seeing Mr. Alberto Rowe today. No follow-ups on file. SUBJECTIVE:     HPI  Mr. Alberto Rowe is a 25 y.o. male established patient who presents for the following concerns:    1. Reactive arthropathy of first metatarsophalangeal joint (HCC)  Overview:  2.5 weeks acute onset without known inciting injury. Located on the dorsal proximal aspect of thumb, though sometimes reaches into the palmar aspect. Works on a computer a lot which seems to exacerbate, also rock climbing. No known injury episodes. Also has been playing a lot of video games lately, Robert Salas came out). Seems to respond to heat, rest, wrist splint, and ibuprofen. Last visit seemed to be dequervain's tenosynovitis plus maybe MTP ligament strain. Trialed splinting and aggressive conservative cares. Today notices significant improvement.

## 2023-09-21 NOTE — ASSESSMENT & PLAN NOTE
I wonder if this has played a role in frequent joint injuries. It like has played a role in the multi tendon/ligamentous injuries seen for visit reason. We will make note of this for future interactions; recommend patient be extra vigilant with protecting joints given family hx of osteoarthritis. Negative ROS for cardiovascular abnormalities, Amanda, EDS.

## 2023-09-21 NOTE — PROGRESS NOTES
Chief Complaint   Patient presents with    Follow-up     Fu 1st digit pn chronic in nature hs resolved some with use of splint

## 2023-09-21 NOTE — ASSESSMENT & PLAN NOTE
Greatly improved. Continue cares for an additional week, incorporate heat and then gradually wean off of splint over time and re-engage activity as tolerated. Likely needs to be careful due to hx of joint hypermobility. If not improving, then will need xray and occupational therapy, and could consider advanced imaging and ortho if not improving thereafter.

## 2023-11-09 ENCOUNTER — OFFICE VISIT (OUTPATIENT)
Facility: CLINIC | Age: 28
End: 2023-11-09
Payer: COMMERCIAL

## 2023-11-09 VITALS
BODY MASS INDEX: 22.76 KG/M2 | HEIGHT: 67 IN | TEMPERATURE: 98.4 F | DIASTOLIC BLOOD PRESSURE: 85 MMHG | OXYGEN SATURATION: 98 % | WEIGHT: 145 LBS | RESPIRATION RATE: 20 BRPM | HEART RATE: 105 BPM | SYSTOLIC BLOOD PRESSURE: 126 MMHG

## 2023-11-09 DIAGNOSIS — F43.9 SITUATIONAL STRESS: ICD-10-CM

## 2023-11-09 DIAGNOSIS — F41.9 ANXIETY: ICD-10-CM

## 2023-11-09 DIAGNOSIS — M41.125 ADOLESCENT IDIOPATHIC SCOLIOSIS OF THORACOLUMBAR REGION: ICD-10-CM

## 2023-11-09 DIAGNOSIS — M02.9: ICD-10-CM

## 2023-11-09 PROBLEM — F41.8 ANXIETY ABOUT HEALTH: Status: ACTIVE | Noted: 2023-11-09

## 2023-11-09 PROBLEM — R45.89 ANXIETY ABOUT HEALTH: Status: ACTIVE | Noted: 2023-11-09

## 2023-11-09 PROCEDURE — 99214 OFFICE O/P EST MOD 30 MIN: CPT | Performed by: FAMILY MEDICINE

## 2023-11-09 RX ORDER — BUSPIRONE HYDROCHLORIDE 10 MG/1
10 TABLET ORAL 2 TIMES DAILY
Qty: 180 TABLET | Refills: 3 | COMMUNITY
Start: 2023-11-09

## 2023-11-09 ASSESSMENT — PATIENT HEALTH QUESTIONNAIRE - PHQ9
SUM OF ALL RESPONSES TO PHQ QUESTIONS 1-9: 0
2. FEELING DOWN, DEPRESSED OR HOPELESS: 0
1. LITTLE INTEREST OR PLEASURE IN DOING THINGS: 0
SUM OF ALL RESPONSES TO PHQ9 QUESTIONS 1 & 2: 0
SUM OF ALL RESPONSES TO PHQ QUESTIONS 1-9: 0

## 2023-11-09 NOTE — PROGRESS NOTES
ASSESSMENT/PLAN:  Mr. Evette Reese is a 25 y.o. male established patient who presents for Follow-up (Muscle twitching for 4-5 days generalized in nature intentional movement makes it better no aggravating factors /)  , found to have the followin. Adolescent idiopathic scoliosis of thoracolumbar region  Assessment & Plan:   Monitored by specialist- no acute findings meriting change in the plan  2. Reactive arthropathy of first metatarsophalangeal joint [facility])  Assessment & Plan:   Well-controlled, continue current treatment plan  3. Situational stress  4. Anxiety  Assessment & Plan:   Uncontrolled, changes made today: increase buspar from 5 to 10mg BID. get connected with psychology. It was a pleasure seeing Mr. Evette Reese today. Return in about 6 weeks (around 2023) for anxiety management. SUBJECTIVE:     HPI  Mr. Evette Reese is a 25 y.o. male established patient who presents for the following concerns:    1. Adolescent idiopathic scoliosis of thoracolumbar region  Overview:  Goes to orthopedic surgery every 6 months for monitoring because borderline needs surgery. 2. Reactive arthropathy of first metatarsophalangeal joint (HCC)  Overview:  2.5 weeks acute onset without known inciting injury. Located on the dorsal proximal aspect of thumb, though sometimes reaches into the palmar aspect. Works on a computer a lot which seems to exacerbate, also rock climbing. No known injury episodes. Also has been playing a lot of video games lately, Myrna Gonzalez came out). Seems to respond to heat, rest, wrist splint, and ibuprofen. Last visit seemed to be dequervain's tenosynovitis plus maybe MTP ligament strain. Trialed splinting and aggressive conservative cares. Today notices significant improvement. 3. Situational stress  4. Anxiety  Overview:   Worried about occassional small twitches in various muscle 3-4 sec in duration not associated with cramping, weakness, muscle

## 2023-11-09 NOTE — ASSESSMENT & PLAN NOTE
Monitored by specialist- no acute findings meriting change in the plan
Uncontrolled, changes made today: increase buspar from 5 to 10mg BID. get connected with psychology.
Well-controlled, continue current treatment plan
none

## 2023-11-10 ENCOUNTER — PATIENT MESSAGE (OUTPATIENT)
Facility: CLINIC | Age: 28
End: 2023-11-10

## 2023-11-10 DIAGNOSIS — R25.3 MUSCLE TWITCHING: Primary | ICD-10-CM

## 2023-11-10 NOTE — TELEPHONE ENCOUNTER
From: Marisol Ellison  To: Dr. Melinda Cueva  Sent: 11/10/2023 10:06 AM EST  Subject: Medhat James    Hi Doctor Mireya Schwarz,   Thanks so much for the talk yesterday, it helped a lot. I think I will have a hard time resting though without a few tests. Would you be willing to give me a neruo referral? Thanks again!     [employer]

## 2023-11-11 ENCOUNTER — OFFICE VISIT (OUTPATIENT)
Age: 28
End: 2023-11-11

## 2023-11-11 VITALS
OXYGEN SATURATION: 97 % | WEIGHT: 146.4 LBS | RESPIRATION RATE: 16 BRPM | BODY MASS INDEX: 22.93 KG/M2 | TEMPERATURE: 98.7 F | DIASTOLIC BLOOD PRESSURE: 88 MMHG | HEART RATE: 109 BPM | SYSTOLIC BLOOD PRESSURE: 135 MMHG

## 2023-11-11 DIAGNOSIS — R25.3 MUSCLE TWITCHING: Primary | ICD-10-CM

## 2023-11-11 DIAGNOSIS — F41.9 ANXIETY: ICD-10-CM

## 2023-11-11 NOTE — PROGRESS NOTES
Cinthia Phillips (:  1995) is a 25 y.o. male,New patient, here for evaluation of the following chief complaint(s):  Spasms (HAD COUPLE OF MUSCLE SPASMS, WANT TO GET CHECKED OUT)      ASSESSMENT/PLAN:    1. Muscle twitching  - Theres is no muscle weakness, pain, atrophy likely related to electrolytes/vitamin deficiency. Advised to start mens multivitamins, push fluids with electrolytes and follow up with PCP. Discussed neurology as patient would like to be evaluated by specialty, advised patient to follow up with PCP and orthopedic back specialist first has he has hx of scoliosis. If symptoms persists/worsen return to clinic or go to ED. 2. Anxiety  - Patient is on buspoirone 10mg BID, reports taking medication as he should although symptoms of muscle twitching and online research of symptoms have brought on more anxiety than usual. Discussed mindfullness excercises to help relax. Advised patient to stop using internet, follow up with PCP in 2 days. Patient is tearful although in agreement with plan. SUBJECTIVE/OBJECTIVE:  25 y.o. male presents with symptoms of twitching of thumb X1 week,now having twtiching of muscles through out his body. Has never experienced symptoms like this in the past. Denies any other symptoms such as muscle pain, weakness, loss of bowel or bladder incontinence. Denies injuries or falls. Reports his diet isn't great and constitutes of fast foods/eating out a lot. Does not take in much water during the day. Hx of anxiety/ medical anxiety and scoliosis, family history of parkinson's disease in grandfather. Patient reports he has not been unable to focus on anything else as as he reports feeling fixated on his symptoms doing online research. Physical Exam  Constitutional:       Appearance: Normal appearance. He is well-developed and well-groomed. He is not ill-appearing. HENT:      Head: Normocephalic.       Right Ear: Tympanic membrane normal.      Left Ear: Tympanic

## 2023-11-13 ENCOUNTER — OFFICE VISIT (OUTPATIENT)
Age: 28
End: 2023-11-13

## 2023-11-13 VITALS
BODY MASS INDEX: 21.79 KG/M2 | HEIGHT: 68 IN | RESPIRATION RATE: 97 BRPM | WEIGHT: 143.8 LBS | SYSTOLIC BLOOD PRESSURE: 132 MMHG | TEMPERATURE: 99.6 F | HEART RATE: 112 BPM | OXYGEN SATURATION: 97 % | DIASTOLIC BLOOD PRESSURE: 89 MMHG

## 2023-11-13 DIAGNOSIS — F41.9 ANXIETY: Primary | ICD-10-CM

## 2023-11-13 RX ORDER — HYDROXYZINE HYDROCHLORIDE 25 MG/1
25 TABLET, FILM COATED ORAL EVERY 8 HOURS PRN
Qty: 30 TABLET | Refills: 0 | Status: ON HOLD | OUTPATIENT
Start: 2023-11-13 | End: 2023-11-17 | Stop reason: HOSPADM

## 2023-11-14 ENCOUNTER — HOSPITAL ENCOUNTER (INPATIENT)
Facility: HOSPITAL | Age: 28
LOS: 3 days | Discharge: HOME | End: 2023-11-17
Attending: EMERGENCY MEDICINE | Admitting: PSYCHIATRY & NEUROLOGY
Payer: COMMERCIAL

## 2023-11-14 ENCOUNTER — TELEPHONE (OUTPATIENT)
Facility: CLINIC | Age: 28
End: 2023-11-14

## 2023-11-14 DIAGNOSIS — M62.838 SPASM OF MUSCLE: ICD-10-CM

## 2023-11-14 DIAGNOSIS — R45.851 SUICIDAL IDEATION: Primary | ICD-10-CM

## 2023-11-14 PROBLEM — F33.0 MDD (MAJOR DEPRESSIVE DISORDER), RECURRENT EPISODE, MILD (HCC): Status: ACTIVE | Noted: 2023-11-14

## 2023-11-14 LAB
AMPHET UR QL SCN: NEGATIVE
ANION GAP SERPL CALC-SCNC: 13 MMOL/L (ref 5–15)
BARBITURATES UR QL SCN: NEGATIVE
BASOPHILS # BLD: 0.1 K/UL (ref 0–0.1)
BASOPHILS NFR BLD: 1 % (ref 0–1)
BENZODIAZ UR QL: NEGATIVE
BUN SERPL-MCNC: 9 MG/DL (ref 6–20)
BUN/CREAT SERPL: 8 (ref 12–20)
CALCIUM SERPL-MCNC: 9 MG/DL (ref 8.5–10.1)
CANNABINOIDS UR QL SCN: NEGATIVE
CHLORIDE SERPL-SCNC: 99 MMOL/L (ref 97–108)
CO2 SERPL-SCNC: 28 MMOL/L (ref 21–32)
COCAINE UR QL SCN: NEGATIVE
CREAT SERPL-MCNC: 1.11 MG/DL (ref 0.7–1.3)
DIFFERENTIAL METHOD BLD: ABNORMAL
EOSINOPHIL # BLD: 0 K/UL (ref 0–0.4)
EOSINOPHIL NFR BLD: 0 % (ref 0–7)
ERYTHROCYTE [DISTWIDTH] IN BLOOD BY AUTOMATED COUNT: 12.8 % (ref 11.5–14.5)
ETHANOL SERPL-MCNC: <10 MG/DL (ref 0–0.08)
FLUAV RNA SPEC QL NAA+PROBE: NOT DETECTED
FLUBV RNA SPEC QL NAA+PROBE: NOT DETECTED
GLUCOSE SERPL-MCNC: 67 MG/DL (ref 65–100)
HCT VFR BLD AUTO: 50.3 % (ref 36.6–50.3)
HGB BLD-MCNC: 17.2 G/DL (ref 12.1–17)
IMM GRANULOCYTES # BLD AUTO: 0 K/UL (ref 0–0.04)
IMM GRANULOCYTES NFR BLD AUTO: 0 % (ref 0–0.5)
LYMPHOCYTES # BLD: 1.2 K/UL (ref 0.8–3.5)
LYMPHOCYTES NFR BLD: 15 % (ref 12–49)
Lab: NORMAL
MCH RBC QN AUTO: 29.8 PG (ref 26–34)
MCHC RBC AUTO-ENTMCNC: 34.2 G/DL (ref 30–36.5)
MCV RBC AUTO: 87 FL (ref 80–99)
METHADONE UR QL: NEGATIVE
MONOCYTES # BLD: 0.7 K/UL (ref 0–1)
MONOCYTES NFR BLD: 8 % (ref 5–13)
NEUTS SEG # BLD: 6.1 K/UL (ref 1.8–8)
NEUTS SEG NFR BLD: 76 % (ref 32–75)
NRBC # BLD: 0 K/UL (ref 0–0.01)
NRBC BLD-RTO: 0 PER 100 WBC
OPIATES UR QL: NEGATIVE
PCP UR QL: NEGATIVE
PLATELET # BLD AUTO: 347 K/UL (ref 150–400)
PMV BLD AUTO: 9 FL (ref 8.9–12.9)
POTASSIUM SERPL-SCNC: 4.2 MMOL/L (ref 3.5–5.1)
RBC # BLD AUTO: 5.78 M/UL (ref 4.1–5.7)
SARS-COV-2 RNA RESP QL NAA+PROBE: NOT DETECTED
SODIUM SERPL-SCNC: 140 MMOL/L (ref 136–145)
WBC # BLD AUTO: 8.1 K/UL (ref 4.1–11.1)

## 2023-11-14 PROCEDURE — 6370000000 HC RX 637 (ALT 250 FOR IP): Performed by: EMERGENCY MEDICINE

## 2023-11-14 PROCEDURE — 1240000000 HC EMOTIONAL WELLNESS R&B

## 2023-11-14 PROCEDURE — 36415 COLL VENOUS BLD VENIPUNCTURE: CPT

## 2023-11-14 PROCEDURE — 87636 SARSCOV2 & INF A&B AMP PRB: CPT

## 2023-11-14 PROCEDURE — 93005 ELECTROCARDIOGRAM TRACING: CPT | Performed by: EMERGENCY MEDICINE

## 2023-11-14 PROCEDURE — 90791 PSYCH DIAGNOSTIC EVALUATION: CPT

## 2023-11-14 PROCEDURE — 80048 BASIC METABOLIC PNL TOTAL CA: CPT

## 2023-11-14 PROCEDURE — 85025 COMPLETE CBC W/AUTO DIFF WBC: CPT

## 2023-11-14 PROCEDURE — 80307 DRUG TEST PRSMV CHEM ANLYZR: CPT

## 2023-11-14 PROCEDURE — 99285 EMERGENCY DEPT VISIT HI MDM: CPT

## 2023-11-14 PROCEDURE — 82077 ASSAY SPEC XCP UR&BREATH IA: CPT

## 2023-11-14 RX ORDER — ACETAMINOPHEN 325 MG/1
650 TABLET ORAL EVERY 4 HOURS PRN
Status: DISCONTINUED | OUTPATIENT
Start: 2023-11-14 | End: 2023-11-17 | Stop reason: HOSPADM

## 2023-11-14 RX ORDER — LORAZEPAM 1 MG/1
1 TABLET ORAL ONCE
Status: COMPLETED | OUTPATIENT
Start: 2023-11-14 | End: 2023-11-14

## 2023-11-14 RX ORDER — TRAZODONE HYDROCHLORIDE 50 MG/1
50 TABLET ORAL NIGHTLY PRN
Status: DISCONTINUED | OUTPATIENT
Start: 2023-11-14 | End: 2023-11-17 | Stop reason: HOSPADM

## 2023-11-14 RX ORDER — HALOPERIDOL 5 MG/ML
5 INJECTION INTRAMUSCULAR EVERY 4 HOURS PRN
Status: DISCONTINUED | OUTPATIENT
Start: 2023-11-14 | End: 2023-11-17 | Stop reason: HOSPADM

## 2023-11-14 RX ORDER — DIPHENHYDRAMINE HYDROCHLORIDE 50 MG/ML
50 INJECTION INTRAMUSCULAR; INTRAVENOUS EVERY 4 HOURS PRN
Status: DISCONTINUED | OUTPATIENT
Start: 2023-11-14 | End: 2023-11-17 | Stop reason: HOSPADM

## 2023-11-14 RX ORDER — POLYETHYLENE GLYCOL 3350 17 G/17G
17 POWDER, FOR SOLUTION ORAL DAILY PRN
Status: DISCONTINUED | OUTPATIENT
Start: 2023-11-14 | End: 2023-11-17 | Stop reason: HOSPADM

## 2023-11-14 RX ORDER — HALOPERIDOL 5 MG/1
5 TABLET ORAL EVERY 4 HOURS PRN
Status: DISCONTINUED | OUTPATIENT
Start: 2023-11-14 | End: 2023-11-17 | Stop reason: HOSPADM

## 2023-11-14 RX ORDER — MAGNESIUM HYDROXIDE/ALUMINUM HYDROXICE/SIMETHICONE 120; 1200; 1200 MG/30ML; MG/30ML; MG/30ML
30 SUSPENSION ORAL EVERY 6 HOURS PRN
Status: DISCONTINUED | OUTPATIENT
Start: 2023-11-14 | End: 2023-11-17 | Stop reason: HOSPADM

## 2023-11-14 RX ORDER — HYDROXYZINE HYDROCHLORIDE 25 MG/1
50 TABLET, FILM COATED ORAL 3 TIMES DAILY PRN
Status: DISCONTINUED | OUTPATIENT
Start: 2023-11-14 | End: 2023-11-17 | Stop reason: HOSPADM

## 2023-11-14 RX ADMIN — LORAZEPAM 1 MG: 1 TABLET ORAL at 15:15

## 2023-11-14 ASSESSMENT — SLEEP AND FATIGUE QUESTIONNAIRES
DO YOU HAVE DIFFICULTY SLEEPING: YES
DO YOU USE A SLEEP AID: NO
AVERAGE NUMBER OF SLEEP HOURS: 5
SLEEP PATTERN: DISTURBED/INTERRUPTED SLEEP

## 2023-11-14 ASSESSMENT — LIFESTYLE VARIABLES
HOW OFTEN DO YOU HAVE A DRINK CONTAINING ALCOHOL: NEVER
HOW MANY STANDARD DRINKS CONTAINING ALCOHOL DO YOU HAVE ON A TYPICAL DAY: PATIENT DOES NOT DRINK

## 2023-11-14 ASSESSMENT — PAIN - FUNCTIONAL ASSESSMENT: PAIN_FUNCTIONAL_ASSESSMENT: NONE - DENIES PAIN

## 2023-11-14 NOTE — PLAN OF CARE
Problem: Anxiety  Goal: Will report anxiety at manageable levels  Description: INTERVENTIONS:  1. Administer medication as ordered  2. Teach and rehearse alternative coping skills  3. Provide emotional support with 1:1 interaction with staff  Outcome: Not Progressing     Problem: Coping  Goal: Pt/Family able to verbalize concerns and demonstrate effective coping strategies  Description: INTERVENTIONS:  1. Assist patient/family to identify coping skills, available support systems and cultural and spiritual values  2. Provide emotional support, including active listening and acknowledgement of concerns of patient and caregivers  3. Reduce environmental stimuli, as able  4. Instruct patient/family in relaxation techniques, as appropriate  5. Assess for spiritual pain/suffering and initiate Spiritual Care, Psychosocial Clinical Specialist consults as needed  Outcome: Not Progressing     Problem: Depression/Self Harm  Goal: Effect of psychiatric condition will be minimized and patient will be protected from self harm  Description: INTERVENTIONS:  1. Assess impact of patient's symptoms on level of functioning, self care needs and offer support as indicated  2. Assess patient/family knowledge of depression, impact on illness and need for teaching  3. Provide emotional support, presence and reassurance  4. Assess for possible suicidal thoughts or ideation. If patient expresses suicidal thoughts or statements do not leave alone, initiate Suicide Precautions, move to a room close to the nursing station and obtain sitter  5.  Initiate consults as appropriate with Mental Health Professional, Spiritual Care, Psychosocial CNS, and consider a recommendation to the LIP for a Psychiatric Consultation  Outcome: Not Progressing

## 2023-11-14 NOTE — TELEPHONE ENCOUNTER
Found appt sooner than 12/20. Addressed in a phone call with pt. He will see Dr. Yareli Vences on 11/20.      ----- Message from [facility] sent at 11/13/2023  3:25 PM EST -----  Subject: Appointment Request    Reason for Call: Established Patient Appointment needed: Routine ED Follow   Up Visit    QUESTIONS    Reason for appointment request? No appointments available during search     Additional Information for Provider? The patient was seen at Urgent Care   with [id number] in [place] on 11/11/23 for twitching and neurological   symptoms happening. The patient states that he is not having any new or   worsening symptoms since being seen at Urgent Care. The pt is asking to   see any provider asap. No appt's avail in salesforce for Dr. Yareli Vences or   Dr. Nany Valdes.  Please call the patient back to advise.   ---------------------------------------------------------------------------  --------------  Jessie ALATORRE  [phone]; OK to leave message on voicemail  ---------------------------------------------------------------------------  --------------  SCRIPT ANSWERS

## 2023-11-14 NOTE — BSMART NOTE
is oriented to time, place, person and situation. The patient's speech shows no evidence of impairment. The patient's mood is depressed, is anxious, and displays anhedonia. The range of affect is constricted. The patient's thought content demonstrates delusions (somatic). The thought process shows no evidence of impairment. The patient's perception shows no evidence of impairment. The patient's memory shows no evidence of impairment. The patient's appetite shows no evidence of impairment . The patient's sleep has evidence of insomnia. The patient's insight shows no evidence of impairment. The patient's judgement shows no evidence of impairment. Section V - Substance Abuse  The patient is not using substances. Section VI - Living Arrangements  The patient . The patient lives with a spouse. The patient has no children. The patient does plan to return home upon discharge. The patient does not have legal issues pending. The patient's source of income comes from employment. Anglican and cultural practices have not been voiced at this time. The patient's greatest support comes from wife/Laine and this person will be involved with the treatment. The patient has not been in an event described as horrible or outside the realm of ordinary life experience either currently or in the past.  The patient has not been a victim of sexual/physical abuse. Section VII - Other Areas of Clinical Concern  The highest grade achieved is masters degree with the overall quality of school experience being described as ok. The patient is currently employed and speaks [place] as a primary language. The patient has no communication impairments affecting communication. The patient's preference for learning can be described as: can read and write adequately.   The patient's hearing is normal.  The patient's vision is normal.      Christine Leslie, RAMAKRISHNA

## 2023-11-14 NOTE — ED TRIAGE NOTES
Triage Note: Patient arrives to ER complaining of suicidal ideations that started yesterday. States he is currently being treated for anxiety, endorses not taking his medication as regularly as he should be. Patient is tearful during triage. CSSRS is HIGH RISK. Patient brought back to treatment area accompanied by wife. Patient changed into paper scrubs and belongings secured at nurse's station. Constant observer at bedside.

## 2023-11-14 NOTE — BH NOTE
Checks: 4 times per hour, close  Mood:Normal: No  Mood: Depressed, Anxious  Motor Activity:Normal: Yes  Eye Contact: Fair  Observed Behavior: Cooperative  Sexual Misconduct History: Current - no  Preception: [place] to person, [place] to time, [place] to place, [place] to situation  Attention:Normal: Yes  Thought Processes: Unremarkable  Thought Content:Normal: No  Depression Symptoms: Impaired concentration, Change in energy level  Anxiety Symptoms: Generalized, Feelings of doom, Unexplained fears  Melanie Symptoms: No problems reported or observed. Hallucinations: None  Delusions: No  Memory:Normal: Yes  Insight and Judgment: No  Insight and Judgment: Poor judgment, Poor insight    Pt admitted with followings belongings:  Dental Appliances: None  Vision - Corrective Lenses: None  Hearing Aid: None  Jewelry: None  Body Piercings Removed: N/A  Clothing: At home  Other Valuables: At home     Valuables sent home with wife. Patient oriented to surroundings and program expectations and copy of patient rights given. Patient placed on Q15 minute rounds for safety.                     Destiney Victoria RN

## 2023-11-14 NOTE — PROGRESS NOTES
[employer] assessment completed, and suicide risk level noted to be high. Primary Nurse Becky and Charge Nurse Jayashree Vail and Physician Dr Kristen Nickerson notified. Concerns not observed. Security/Off- has not been notified.

## 2023-11-15 LAB
EKG ATRIAL RATE: 84 BPM
EKG DIAGNOSIS: NORMAL
EKG P AXIS: 58 DEGREES
EKG P-R INTERVAL: 130 MS
EKG Q-T INTERVAL: 370 MS
EKG QRS DURATION: 78 MS
EKG QTC CALCULATION (BAZETT): 437 MS
EKG R AXIS: 60 DEGREES
EKG T AXIS: 31 DEGREES
EKG VENTRICULAR RATE: 84 BPM

## 2023-11-15 PROCEDURE — 93010 ELECTROCARDIOGRAM REPORT: CPT | Performed by: SPECIALIST

## 2023-11-15 PROCEDURE — 6370000000 HC RX 637 (ALT 250 FOR IP): Performed by: NURSE PRACTITIONER

## 2023-11-15 PROCEDURE — 6370000000 HC RX 637 (ALT 250 FOR IP)

## 2023-11-15 PROCEDURE — 1240000000 HC EMOTIONAL WELLNESS R&B

## 2023-11-15 RX ORDER — FLUOXETINE 10 MG/1
10 CAPSULE ORAL DAILY
Status: DISCONTINUED | OUTPATIENT
Start: 2023-11-15 | End: 2023-11-17 | Stop reason: HOSPADM

## 2023-11-15 RX ADMIN — HYDROXYZINE HYDROCHLORIDE 50 MG: 25 TABLET, FILM COATED ORAL at 21:32

## 2023-11-15 RX ADMIN — FLUOXETINE 10 MG: 10 CAPSULE ORAL at 11:27

## 2023-11-15 RX ADMIN — TRAZODONE HYDROCHLORIDE 50 MG: 50 TABLET ORAL at 21:32

## 2023-11-15 NOTE — H&P
Department of Psychiatry  Nurse Practitioner History and Physical - Adult         CHIEF COMPLAINT:  \"My anxiety just started going through the roof\"    History obtained from:  patient, electronic medical record    HISTORY OF PRESENT ILLNESS:      Boni Samuel is a 25year old male admitted to the [facility] with a plan to shoot himself. He reports that he has significant health anxiety. He noticed some \"twitches\" after several weeks of stress. He became stressed about this and began googling his symptoms and was concerned about developing ALS and went to the doctor. He feels that talking with a doctor about his concerns helps him the most. He does not have any current outpatient providers. He reports that he did go to therapy when he was in high school due to multiple deaths in his small class. He began having significant health anxiety during this time. He denies any suicide attempts. He denies current SI. He does have access to firearms at home. He lives with his wife and their dog and works full time. PSYCHIATRIC HISTORY:      The patient is not currently receiving care for the above psychiatric illness. Past mental health outpatient care includes:  1-5 treatment centers    Past mental health hospitalizations: No previous hospitalizations      Past psychiatric medications include:  Anti-Anxiety Agents:  Buspirone (BUSPAR)    Past Medical History:        Diagnosis Date    Congestion of nasal sinus     Mouth ulcers     history of    Reactive arthropathy of first metatarsophalangeal joint ([address]) 9/7/2023    2.5 weeks acute onset without known inciting injury. Located on the dorsal proximal aspect of thumb, though sometimes reaches into the palmar aspect. Works on a computer a lot which seems to exacerbate, also rock climbing. No known injury episodes. Also has been playing a lot of video games lately, Esme Anne came out). Seems to respond to heat, rest, wrist splint, and ibuprofen.   Last visit s Obsessions/instrusive thoughts:  obsessions about health  Cognition:  oriented to person, place, and time    STRENGTHS: stable housing, good social support    DISABILITIES: lack of outpatient services    PLAN:  Admit to [employer] further information  Medication management as indicated  Initiate prozac 10mg daily for treatment of depression and anxiety  Disposition planning with social work  Estimated length of stay 5-7 days

## 2023-11-15 NOTE — BH NOTE
PSYCHOSOCIAL ASSESSMENT  :Patient identifying info:   Maye Braswell is a 25 y.o., male admitted 11/14/2023  1:45 PM     Presenting problem and precipitating factors: Patient is a 26 yo male who arrives at ED accompanied by wife with chief complaint of suicidal ideations that started yesterday. States he is currently being treated for anxiety, endorses not taking his medication as regularly as he should be. Patient is tearful during triage. CSSRS is HIGH RISK. Patient brought back to treatment area accompanied by wife. The [facility] [place] on 11/11/23 for twitching and neurological symptoms happening. The patient states that he is not having any new or worsening symptoms since being seen at Urgent Care. Patient presented to the ED while having a panic attack. Patient reports sever health anxiety, and believes he has ALS. Patient reports that his belief has triggered his SI. Patient owns 7-10 guns, and he was planning on using them to \"end his life\". Patient lives with his wife who is supportive. Patient is prescribed Buspar from his PCP. Patient has no previous hospitalizations, and does not see someone for mental health. Patient reports he does not use substances. Mental status assessment: Patient is a 26 yo male who was admitted to the unit from the ED on a voluntary basis due to SI with plan and worsening anxiety. Patient presented appropiately groomed, with normal speech and eye contact. Patient's mood was depressed and anxious. Patient presented having a panic attack. Patient's affect was constricted. Patient is AOx4. Patient's memory and attention show no sign of impairment. Patient endorses SI with plan, denies HI/AVH. Patient reports that he believes he has ALS. Patient's behavior is restless and panic. Patient reports that he is unable to sleep. Patient's insight and judgement are impaired.      Strengths/Recreation/Coping Skills: Employed, family support,

## 2023-11-15 NOTE — GROUP NOTE
Group Therapy Note    Date: 11/15/2023    Group Start Time: 1100  Group End Time: 1200  Group Topic: Topic Group    [facility] 3 ACUTE BEHAV [facility]    Fátima Elizabeth        Group Therapy Note         Patient's Goal:  To participate in relaxation activity    Notes:  Pleasant -active participant    Status After Intervention:  Improved    Participation Level:  Active Listener and Interactive    Participation Quality: Appropriate, Attentive, and Sharing      Speech:  normal      Thought Process/Content: Logical      Affective Functioning: Congruent      Mood: euthymic      Level of consciousness:  Alert, Oriented x4, and Attentive      Response to Learning: Progressing to goal      Endings: None Reported    Modes of Intervention: Activity      Discipline Responsible: Recreational Therapist      Signature:  Mono Hebert

## 2023-11-15 NOTE — PROGRESS NOTES
Patient actively participated in Spirituality Group about knowing God is with you, and that they are not alone, in the Behavioral Health unit.  utilized music, lyrics to favorite songs, words of encouragement and affirmation, scripture, and testimony as well as a devotional reading to facilitate the group discussion and enhance group dynamics. Rev.  Edyta Wyatt, [facility], [facility]

## 2023-11-15 NOTE — CARE COORDINATION
11/15/23 1214   ITP   Date of Plan 11/15/23   Primary Diagnosis Code MDD   Barriers to Treatment Need for psychoeducation;Psychiatric symptom (comment)  (Anxiety)   Strengths Incorporated in Plan Acknowledging need for assistance; Family supports   Plan of Care   Long Term Goal (LTG) Stated in patient/guardian terms Patient will maintain mental health in the community   Short Term Goal 1   Short Term Goal 1 Client will be oriented to program and staff, and participate in assessment process   Baseline Functioning Patient is communicative with staff   Target Patient will be an active participant in treatment team and discharge planning   Objectives Other (comment)  (Patient will be engaged in treatment)   Intervention 1 Acknowledge client strengths;Milieu therapy and support  (Patient will meet with treatment team)   Frequency Daily   Measured by Staff observation;Self report   Staff Responsible Clinical staff;[facility] staff   STG Goal 1 Status: Patient Appears to be  Progressing toward treatment plan goal   Short Term Goal 2   Short Term Goal 2 Client will learn and demonstrate effective coping skills   Baseline Functioning Patient does not have effective coping skills   Target Patient will learn and discharge with effective coping skills   Objectives Client will participate in group therapy   Intervention 1 Acknowledge client strengths;Group therapy;Milieu therapy and support;Crisis support   Frequency Daily   Measured by Staff observation;Self report   Staff Responsible Clinical staff;[facility] staff   STG Goal 2 Status: Patient Appears to be  Progressing toward treatment plan goal   Crisis/Safety/Discharge Plan   Crisis/Safety Plan Standard program interventions and protocol   Comprehensive Assessment Completion Date 11/15/23   Discharge Plan Home with outpatient support     ZION Medrano

## 2023-11-15 NOTE — BH NOTE
Date: 11/15/23     Time: 10-10:45 AM     Type of group: CBT Skills     Topic: Automatic Thoughts     Participants: 8     Facilitator led a group on CBT skills. The topic of the group was automatic thoughts. Patients discussed what automatic thoughts look like, what triggers them, and how to change them. Patients discussed what might positive thoughts look like. Patients shared triggers for negative thoughts, and their peers shared how they might change them. The group was engaged. Patient reported he was \"alright\". Patient presented as depressed and flat. Patient was withdrawn during group, and had to be encouraged to engage.      ZION Cardoza

## 2023-11-15 NOTE — GROUP NOTE
Group Therapy Note    Date: 11/15/2023    Group Start Time: 1500  Group End Time: 1600  Group Topic: Recreational    [address] 3 ACUTE BEHAV TH    [place], [facility]        Group Therapy Note           Patient's Goal:  To concentrate on selected task    Notes:  Pt did not attend session    Discipline Responsible: Recreational Therapist      Signature:  Josiah Díaz

## 2023-11-16 PROCEDURE — 6370000000 HC RX 637 (ALT 250 FOR IP): Performed by: NURSE PRACTITIONER

## 2023-11-16 PROCEDURE — 1240000000 HC EMOTIONAL WELLNESS R&B

## 2023-11-16 RX ADMIN — FLUOXETINE 10 MG: 10 CAPSULE ORAL at 08:12

## 2023-11-16 NOTE — BH NOTE
Date: 11/16/23     Time: 10-10:45     Type of group: Coping Skills     Topic: Distress Tolerance      Participants: 10     Facilitator led a group on coping skills. The topic of the group was distress tolerance. The facilitator educated patients on distress tolerance. The group then discussed how to incorporate distress tolerance into their day to day coping skills. The group ended by identifying a self care goal.      Patient reported he was [place] lot better today\". Patient presented as euthymic. Patient was engaged in group. Patient reported his goal is to \"meditate\".     ZION Hansen

## 2023-11-16 NOTE — BH NOTE
Writer contacted wife Jonny Velazco to request collateral information on the patient. Per Mrs. Wilfredo Huff, pt has a lot of health and medical anxiety that has continued to spiral in the last couple of months. Per Mrs. Wilfredo Huff, pt confided in her that he called the Suicidal Hotline recently because he felt that he needed to talk to someone. Per Mrs. Wilfredo Huff, pt was observed to refrain from participating in normal activities about 3 months ago as evidenced by no longer wanting to engage in climbing activities with her. Per Mrs. Wilfredo Huff, pt has a high stressful job and has been observed to always be checking his work email even when he doesn't have to. Per Mrs. Wilfredo Huff, at baseline pt enjoys going out, socializing, playing video games and will not be isolative. Per Mrs. Wilfredo Huff, currently pt won't eat well due to loss of appetite. Pt will shower daily but forget to brush his teeth. Pt sleeps but does not sleep well as evidenced by having trouble falling asleep and needing naps during the day. Per Mrs. Wilfredo Huff, pt does not use any recreational drugs, and drinks alcohol on occasion. Mrs. Wilfredo Huff shared that she would like to visit pt. Writer informed Mrs. Wilfredo Huff that she discuss with treatment team and coordinate when appropriate. Vanda Estrada MSW Student Intern, Emma Mccoy MSW.

## 2023-11-16 NOTE — GROUP NOTE
Group Therapy Note    Date: 11/16/2023    Group Start Time: 1500  Group End Time: 1600  Group Topic: Recreational    [facility] 3 ACUTE BEHAV [facility]    [place], [address] [facility]        Group Therapy Note           Patient's Goal:  To concentrate on selected task    Notes:  Pt did not attend session    Discipline Responsible: Recreational Therapist      Signature:  Charlee Delatorre

## 2023-11-16 NOTE — PLAN OF CARE
Problem: Anxiety  Goal: Will report anxiety at manageable levels  Description: INTERVENTIONS:  1. Administer medication as ordered  2. Teach and rehearse alternative coping skills  3.  Provide emotional support with 1:1 interaction with staff  11/16/2023 1014 by Ernesto Corey RN  Outcome: Progressing

## 2023-11-16 NOTE — PROGRESS NOTES
Evening assessment complete. Patient was encountered sitting in the day room. Patient was calm, cooperative and pleasant with assessment. Eye contact is noted to be good. Patient appears flat. Mood is noted to be depressed and anxious. Patient endorsed anxiety (6) and depression because \"I miss home\". Denies all SI/HI/AVH. C-SSRS level is  no risk. Patient has been out in the milieu. Patient is med compliant. There are no untoward side effects from medications to note. Fifteen minute rounds are being completed to assure patient safety and attend to care needs. Will continue to monitor for safety. Patient appeared to sleep 8 hours.

## 2023-11-16 NOTE — BH NOTE
Date: 11/16/23     Time: 2-2:45 PM     Type of group: Substance Use     Topic: Social Support     Participants: 10     Facilitator led a group on substance use. The topic of the group was social support. The facilitator led a discussion social support, what it looks like, and why it is important. The group then discussed how to build and maintain social support. The group ended by identifying a goal.      Patient did not attend group. Facilitator encouraged patient to come, and patient reported they did not want to come.       ZION Carrero

## 2023-11-16 NOTE — PLAN OF CARE
Problem: Anxiety  Goal: Will report anxiety at manageable levels  Description: INTERVENTIONS:  1. Administer medication as ordered  2. Teach and rehearse alternative coping skills  3. Provide emotional support with 1:1 interaction with staff  11/15/2023 2312 by Alma Rosa Jensen RN  Outcome: Progressing  11/15/2023 2310 by Alma Rosa Jensen RN  Outcome: Progressing     Problem: Depression/Self Harm  Goal: Effect of psychiatric condition will be minimized and patient will be protected from self harm  Description: INTERVENTIONS:  1. Assess impact of patient's symptoms on level of functioning, self care needs and offer support as indicated  2. Assess patient/family knowledge of depression, impact on illness and need for teaching  3. Provide emotional support, presence and reassurance  4. Assess for possible suicidal thoughts or ideation. If patient expresses suicidal thoughts or statements do not leave alone, initiate Suicide Precautions, move to a room close to the nursing station and obtain sitter  5. Initiate consults as appropriate with Mental Health Professional, Spiritual Care, Psychosocial CNS, and consider a recommendation to the LIP for a Psychiatric Consultation  11/15/2023 2312 by Alma Rosa Jensen RN  Outcome: Progressing  11/15/2023 2310 by Alma Rosa Jensen RN  Outcome: Progressing     Problem: Self Harm/Suicidality  Goal: Will have no self-injury during hospital stay  Description: INTERVENTIONS:  1. Ensure constant observer at bedside with Q15M safety checks  2. Maintain a safe environment  3. Secure patient belongings  4. Ensure family/visitors adhere to safety recommendations  5. Ensure safety tray has been added to patient's diet order  6.   Every shift and PRN: Re-assess suicidal risk via Frequent Screener    Outcome: Progressing

## 2023-11-16 NOTE — BH NOTE
Morning assessment complete. Patient was encountered in room. Patient is calm and cooperative with assessment. No complaints of pain. Eye contact is noted to be good. Mood is noted to be normal. Affect is blunted. Patient has been out in the milieu interacting well with both peers and staff. Patient observed walking in hallway. VS are stable. Patient currently endorses anxiety. Denies depression, SI, HI and AVH. Patient is both med and meal compliant. There are no untoward side effects from medications to note. C-SSRS level is no risk. Hourly rounds are being completed to assure patient safety and attend to care needs.  Monitoring will continue for changes in patient condition

## 2023-11-16 NOTE — BH NOTE
Writer reached out to Mrs. Garcia to discuss discharge planning for tomorrow. Per Mrs. Meri Cruz she is open all day tomorrow to  pt upon discharge. Mrs. Meri Cruz asked what next appointments were scheduled. Writer went over next appointments and confirmed there were no weapons/guns are in the house.         Lady Spears MSW Student Intern

## 2023-11-16 NOTE — BH NOTE
Behavioral Health Treatment Team Note     Patient goal(s) for today: Pt wants to get better and be discharged. Treatment team focus/goals: Monitor and stabilize medications. Progress note: Pt presents ao x 4. Pt presents calm and cooperative. Pt presents focused on his treatment and motivated for discharge. Pt denies SI/HI/AVH at this time. Pt states that anxiety is a 5/10 and depression is at a 2/3. Potential discharge for tomorrow. Writer talked to pt about work life balance and self-care due to increased stress and anxiety from work environment. Pt admitted to not spending enough time on things he enjoys and having to think about how to balance personal and work.       LOS:  2  Expected LOS: 2-4    Insurance info/prescription coverage:  Elnita Edna  Date of last family contact:  11/16/2023  Family requesting physician contact today:  No  Discharge plan:  Discharge to home   Guns in the home:  Yes  Outpatient provider(s):  outpatient    Participating treatment team members: Karlos Cowden, MSW

## 2023-11-17 VITALS
OXYGEN SATURATION: 100 % | DIASTOLIC BLOOD PRESSURE: 89 MMHG | SYSTOLIC BLOOD PRESSURE: 128 MMHG | BODY MASS INDEX: 21.28 KG/M2 | HEIGHT: 68 IN | TEMPERATURE: 97.7 F | WEIGHT: 140.43 LBS | RESPIRATION RATE: 16 BRPM | HEART RATE: 81 BPM

## 2023-11-17 PROCEDURE — 6370000000 HC RX 637 (ALT 250 FOR IP): Performed by: NURSE PRACTITIONER

## 2023-11-17 RX ORDER — HYDROXYZINE 50 MG/1
50 TABLET, FILM COATED ORAL 3 TIMES DAILY PRN
Qty: 30 TABLET | Refills: 0 | Status: SHIPPED | OUTPATIENT
Start: 2023-11-17 | End: 2023-11-27

## 2023-11-17 RX ORDER — FLUOXETINE 10 MG/1
10 CAPSULE ORAL DAILY
Qty: 30 CAPSULE | Refills: 0 | Status: SHIPPED | OUTPATIENT
Start: 2023-11-18 | End: 2023-11-20

## 2023-11-17 RX ADMIN — FLUOXETINE 10 MG: 10 CAPSULE ORAL at 08:37

## 2023-11-17 NOTE — PROGRESS NOTES
Morning assessment complete. Patient was encountered resting in his room. Patient was calm and cooperative with assessment. Eye contact is noted to be good. Patient appears brighter. Mood is noted to be normal with an appropriate affect. Patient currently reports that there are no signs or symptoms of depression or anxiety, also denies all SI/HI/AVH. C-SSRS level is no risk. Patient states that her mood is \"good\". Patient has been out in the milieu. Patient is both med and meal compliant. There are no untoward side effects from medications to note. Fifteen minute rounds are being completed to assure patient safety and attend to care needs. Will continue to monitor for safety. 1215:  Patient walked down to meet his . Patient has no belongings or valuables on the unit. Discharge instructions reviewed with patient.

## 2023-11-17 NOTE — PROGRESS NOTES
Patient is alert , calm and cooperative. Affect is smiling, mood is normal. Patient interacted well,  endorsed feeling okay. On assessment,  denied anxiety, depression, SI, HI, and AVH. No PRN given. No aggressive behavior noted. Emotional Support and encouragement provided. Vital signs entered. Q 15 minutes and hourly rounds in progress. Pt slept for the total of 9 hours. No issues.

## 2023-11-17 NOTE — PLAN OF CARE
Problem: Discharge Planning  Goal: Discharge to home or other facility with appropriate resources  Outcome: Adequate for Discharge     Problem: Anxiety  Goal: Will report anxiety at manageable levels  Description: INTERVENTIONS:  1. Administer medication as ordered  2. Teach and rehearse alternative coping skills  3. Provide emotional support with 1:1 interaction with staff  11/17/2023 1045 by Gina Brown RN  Outcome: Adequate for Discharge  11/17/2023 1028 by Gina Brown RN  Outcome: Progressing     Problem: Coping  Goal: Pt/Family able to verbalize concerns and demonstrate effective coping strategies  Description: INTERVENTIONS:  1. Assist patient/family to identify coping skills, available support systems and cultural and spiritual values  2. Provide emotional support, including active listening and acknowledgement of concerns of patient and caregivers  3. Reduce environmental stimuli, as able  4. Instruct patient/family in relaxation techniques, as appropriate  5. Assess for spiritual pain/suffering and initiate Spiritual Care, Psychosocial Clinical Specialist consults as needed  Outcome: Adequate for Discharge     Problem: Behavior  Goal: Pt/Family maintain appropriate behavior and adhere to behavioral management agreement, if implemented  Description: INTERVENTIONS:  1. Assess patient/family's coping skills and  non-compliant behavior (including use of illegal substances)  2. Notify security of behavior or suspected illegal substances which indicate the need for search of the family and/or belongings  3. Encourage verbalization of thoughts and concerns in a socially appropriate manner  4. Utilize positive, consistent limit setting strategies supporting safety of patient, staff and others  5. Encourage participation in the decision making process about the behavioral management agreement  6. If a visitor's behavior poses a threat to safety call refer to organization policy.   7. Initiate consult

## 2023-11-17 NOTE — GROUP NOTE
Group Therapy Note    Date: 11/17/2023    Group Start Time: 1100  Group End Time: 1200  Group Topic: Topic Group    [facility] 3 ACUTE BEHAV [facility]    Chuckie Covert        Group Therapy Note           Patient's Goal:  To participate in relaxation activity    Notes:  Pleasant upon approach,active participant-elected to listen to music    Discipline Responsible: Recreational Therapist      Signature:  Chuckie Vaca

## 2023-11-17 NOTE — PLAN OF CARE
Problem: Anxiety  Goal: Will report anxiety at manageable levels  Description: INTERVENTIONS:  1. Administer medication as ordered  2. Teach and rehearse alternative coping skills  3. Provide emotional support with 1:1 interaction with staff  Outcome: Progressing     Problem: Self Harm/Suicidality  Goal: Will have no self-injury during hospital stay  Description: INTERVENTIONS:  1. Ensure constant observer at bedside with Q15M safety checks  2. Maintain a safe environment  3. Secure patient belongings  4. Ensure family/visitors adhere to safety recommendations  5. Ensure safety tray has been added to patient's diet order  6. Every shift and PRN: Re-assess suicidal risk via Frequent Screener    Outcome: Progressing     Problem: Depression  Goal: Will be euthymic at discharge  Description: INTERVENTIONS:  1. Administer medication as ordered  2. Provide emotional support via 1:1 interaction with staff  3. Encourage involvement in milieu/groups/activities  4.  Monitor for social isolation  Outcome: Progressing

## 2023-11-17 NOTE — DISCHARGE SUMMARY
PSYCHIATRIC DISCHARGE SUMMARY         IDENTIFICATION:    Patient Name  Navid Vaughn   Date of Birth 1995   [facility] [id number]   Medical Record Number  [id number]      Age  25 y.o. PCP Kylah Merlos MD   Admit date:  2023    Discharge date: 2023   Room Number  323/01  @ [facility]   Date of Service  2023            TYPE OF DISCHARGE: REGULAR               CONDITION AT DISCHARGE: Improved       PROVISIONAL & DISCHARGE DIAGNOSES:      Active Hospital Problems    *MDD (major depressive disorder), recurrent episode, mild (HCC)        DISCHARGE DIAGNOSIS:   Axis I:  SEE ABOVE  Axis II: SEE ABOVE  Axis III: SEE ABOVE  Axis IV:  lack of structure  Axis V:  <50 on admission, 55+ on discharge     CC & HISTORY OF PRESENT ILLNESS:  25year old male admitted to the [facility] for SI with a plan to shoot himself. He reports that he has significant health anxiety. He noticed some \"twitches\" after several weeks of stress. He became stressed about this and began googling his symptoms and was concerned about developing ALS and went to the doctor. He feels that talking with a doctor about his concerns helps him the most. He does not have any current outpatient providers. He reports that he did go to therapy when he was in high school due to multiple deaths in his small class. He began having significant health anxiety during this time. He denies any suicide attempts. He denies current SI. He does have access to firearms at home. He lives with his wife and their dog and works full time.      SOCIAL HISTORY:    Social History     Socioeconomic History    Marital status:      Spouse name: Not on file    Number of children: Not on file    Years of education: Not on file    Highest education level: Not on file   Occupational History    Not on file   Tobacco Use    Smoking status: Former     Types: Cigarettes     Quit date: 2016     Years since quittin.8    Smokeless tobacco: Never

## 2023-11-17 NOTE — GROUP NOTE
Group Therapy Note    Date: 2023    Group Start Time: 900  Group End Time: 915  Group Topic: Community Meeting    [address] [facility] Therapy Note    Attendees: 9       Patient's Goal:  go home and see his wife    Notes:  Feelin-8/10    Status After Intervention:  Improved    Participation Level: Active Listener    Participation Quality: Appropriate and Attentive      Speech:  normal      Thought Process/Content: Logical      Affective Functioning: Congruent      Mood: elevated      Level of consciousness:  Alert      Response to Learning: Able to verbalize current knowledge/experience, Able to verbalize/acknowledge new learning, Able to retain information, and Progressing to goal      Endings: None Reported    Modes of Intervention: Education      Discipline Responsible: Behavorial Health Tech      Signature:   Pattie Gayle

## 2023-11-17 NOTE — BH NOTE
Date: 11/17/23     Time: 10-10:45 AM     Type of group: Discharge Planning     Topic: Safety Planning     Participants: 10     Facilitator led a group on discharge planning. The topic of the group was safety planning. The facilitator distributed unit safety plans. The group reviewed and discussed the different parts of the safety plan. The patients completed the unit safety plan. The group ended by identifying a goal.      Patient reported he was \"great\". Patient reported that he had already completed a safety plan, and left.     ZION Perez

## 2023-11-20 ENCOUNTER — OFFICE VISIT (OUTPATIENT)
Facility: CLINIC | Age: 28
End: 2023-11-20
Payer: COMMERCIAL

## 2023-11-20 VITALS
DIASTOLIC BLOOD PRESSURE: 82 MMHG | WEIGHT: 139 LBS | BODY MASS INDEX: 21.07 KG/M2 | OXYGEN SATURATION: 99 % | RESPIRATION RATE: 20 BRPM | HEART RATE: 92 BPM | HEIGHT: 68 IN | SYSTOLIC BLOOD PRESSURE: 124 MMHG | TEMPERATURE: 98.5 F

## 2023-11-20 DIAGNOSIS — F41.8 ANXIETY ABOUT HEALTH: ICD-10-CM

## 2023-11-20 DIAGNOSIS — R45.851 SUICIDAL THOUGHTS: ICD-10-CM

## 2023-11-20 DIAGNOSIS — F41.8 ANXIETY ABOUT HEALTH: Primary | ICD-10-CM

## 2023-11-20 LAB
T4 FREE SERPL-MCNC: 1.2 NG/DL (ref 0.8–1.5)
TSH SERPL DL<=0.05 MIU/L-ACNC: 0.68 UIU/ML (ref 0.36–3.74)

## 2023-11-20 PROCEDURE — 99214 OFFICE O/P EST MOD 30 MIN: CPT | Performed by: FAMILY MEDICINE

## 2023-11-20 RX ORDER — FLUOXETINE HYDROCHLORIDE 20 MG/1
20 CAPSULE ORAL DAILY
Qty: 90 CAPSULE | Refills: 3 | Status: SHIPPED | OUTPATIENT
Start: 2023-11-20

## 2023-11-20 RX ORDER — MAGNESIUM GLYCINATE 100 MG
2-4 CAPSULE ORAL DAILY
COMMUNITY

## 2023-11-20 NOTE — PROGRESS NOTES
Chief Complaint   Patient presents with    Follow-up     Fu for anxiety and thyroid test.  Still having issues with anxiety every day which is affecting daily living

## 2023-11-20 NOTE — PROGRESS NOTES
ASSESSMENT/PLAN:  Mr. Herb Rizvi is a 25 y.o. male established patient who presents for Follow-up (Fu for anxiety and thyroid test.  Still having issues with anxiety every day which is affecting daily living / )  , found to have the followin. Anxiety about health  Assessment & Plan:   Uncontrolled, continue and titrate up on Prozac from 10 to 20mg daily. continue atarax as needed. could consider buspar again in the future. Orders:  -     TSH; Future  -     T4, Free; Future  2. Suicidal thoughts  Assessment & Plan:   Borderline controlled, safety plan discussed. It was a pleasure seeing Mr. Herb Rizvi today. No follow-ups on file. SUBJECTIVE:     HPI  Mr. Herb Rizvi is a 25 y.o. male established patient who presents for the following concerns:    1. Anxiety about health  Overview: Worried about occassional small twitches in various muscle 3-4 sec in duration not associated with cramping, weakness, muscle atrophy or central symptoms. Patient is worried about health, and notes this happens with a lot of things, had a friend with ALS. But recognizes highly unlikely. Current Meds: Prozac 10mg daily, Atarax 50mg TID prn  Prev Meds: buspar 5mg BID for anxiety  Therapy: none, resistant in the past.    Interval Hx:  - recognizing twitches are normal knows he likely just needs to work on anxiety. - after last visit continued to obsess on the twitching and and started looking up pub med articles looking for signs of ALS, started learning all the neurologic tests. - saw practitioner at Urgent Care; was told hyper-reflexive; at ended up getting first time suicidal thoughts, active made plans to use firearm; but let his wife know. 2. Suicidal thoughts  Overview:  Recent, see anxiety about health.     The following portions of the patient's history were reviewed and updated as appropriate: allergies, current medications, family history, medical history, social history, surgical history

## 2023-11-20 NOTE — ASSESSMENT & PLAN NOTE
Uncontrolled, continue and titrate up on Prozac from 10 to 20mg daily. continue atarax as needed. could consider buspar again in the future.

## 2024-02-08 ENCOUNTER — OFFICE VISIT (OUTPATIENT)
Facility: CLINIC | Age: 29
End: 2024-02-08
Payer: COMMERCIAL

## 2024-02-08 VITALS
HEIGHT: 68 IN | RESPIRATION RATE: 20 BRPM | DIASTOLIC BLOOD PRESSURE: 91 MMHG | BODY MASS INDEX: 22.58 KG/M2 | WEIGHT: 149 LBS | OXYGEN SATURATION: 98 % | HEART RATE: 68 BPM | TEMPERATURE: 98.6 F | SYSTOLIC BLOOD PRESSURE: 125 MMHG

## 2024-02-08 DIAGNOSIS — F41.8 ANXIETY WITH SOMATIC FEATURES: ICD-10-CM

## 2024-02-08 DIAGNOSIS — Z00.00 LABORATORY EXAMINATION ORDERED AS PART OF A COMPLETE PHYSICAL EXAMINATION: ICD-10-CM

## 2024-02-08 DIAGNOSIS — Z00.00 ENCOUNTER FOR WELL ADULT EXAM WITHOUT ABNORMAL FINDINGS: Primary | ICD-10-CM

## 2024-02-08 DIAGNOSIS — F41.8 ANXIETY ABOUT HEALTH: ICD-10-CM

## 2024-02-08 DIAGNOSIS — R25.9 INVOLUNTARY MOVEMENTS: ICD-10-CM

## 2024-02-08 PROCEDURE — 99214 OFFICE O/P EST MOD 30 MIN: CPT | Performed by: FAMILY MEDICINE

## 2024-02-08 PROCEDURE — 99395 PREV VISIT EST AGE 18-39: CPT | Performed by: FAMILY MEDICINE

## 2024-02-08 ASSESSMENT — PATIENT HEALTH QUESTIONNAIRE - PHQ9
SUM OF ALL RESPONSES TO PHQ9 QUESTIONS 1 & 2: 0
SUM OF ALL RESPONSES TO PHQ QUESTIONS 1-9: 0
1. LITTLE INTEREST OR PLEASURE IN DOING THINGS: 0
2. FEELING DOWN, DEPRESSED OR HOPELESS: 0

## 2024-02-08 NOTE — PROGRESS NOTES
Well Adult Note  Name: eMrritt Garcia Today’s Date: 2024   MRN: [id number] Sex: Male   Age: 28 y.o. Ethnicity: Non- / Non    : 1995 Race: White (non-)      Merritt Garcia is here for well adult exam.  History:  Over the last 4 months has had involuntary movement/muscle twitching  Was seen several times by Dr Weller and also was seen with Urgent Care    Was higher with caffeine but is not back to baseline yet  Involuntary motor twitching doesn't get in the way of doing things he likes like rock climbing or eating soup  He says at this time it is more of an annoyance    Rare etoh  Rare cannabis  No tob  No illicit    Supplements: magnesium    Snowboarding last week was no problem  Staying active with rock climbing    No issues signing name, writing documents, typing    No  strength change    At the time it started 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    No Known Allergies      Prior to Visit Medications    Medication Sig Taking? 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

## 2024-02-08 NOTE — PROGRESS NOTES
Chief Complaint   Patient presents with    Annual Exam     No issues   Fating for labs, if needed

## 2024-03-21 ENCOUNTER — OFFICE VISIT (OUTPATIENT)
Age: 29
End: 2024-03-21
Payer: COMMERCIAL

## 2024-03-21 VITALS
SYSTOLIC BLOOD PRESSURE: 135 MMHG | RESPIRATION RATE: 18 BRPM | BODY MASS INDEX: 22.73 KG/M2 | HEIGHT: 68 IN | HEART RATE: 94 BPM | DIASTOLIC BLOOD PRESSURE: 91 MMHG | WEIGHT: 150 LBS

## 2024-03-21 DIAGNOSIS — R29.2 ABNORMAL DTR (DEEP TENDON REFLEX): ICD-10-CM

## 2024-03-21 DIAGNOSIS — R25.3 FASCICULATIONS: Primary | ICD-10-CM

## 2024-03-21 PROCEDURE — 99204 OFFICE O/P NEW MOD 45 MIN: CPT | Performed by: PSYCHIATRY & NEUROLOGY

## 2024-03-21 ASSESSMENT — PATIENT HEALTH QUESTIONNAIRE - PHQ9
SUM OF ALL RESPONSES TO PHQ QUESTIONS 1-9: 0
SUM OF ALL RESPONSES TO PHQ QUESTIONS 1-9: 0
1. LITTLE INTEREST OR PLEASURE IN DOING THINGS: NOT AT ALL
SUM OF ALL RESPONSES TO PHQ9 QUESTIONS 1 & 2: 0
SUM OF ALL RESPONSES TO PHQ QUESTIONS 1-9: 0
SUM OF ALL RESPONSES TO PHQ QUESTIONS 1-9: 0
2. FEELING DOWN, DEPRESSED OR HOPELESS: NOT AT ALL

## 2024-03-21 NOTE — PROGRESS NOTES
[facility] at [facility] at [address] [place] [address]  [address] [facility] [address] [place] [address] [phone] Office  [phone] Facsimile           Referring: Hill Weller MD  [id number] [facility]  [address]  [place] [address]     Chief Complaint   Patient presents with    New Patient     Patient is here for muscle twitching. The twitching started about 5 months ago. It would move and be in random places but first started in his thumbs. Patient reports that its not as bad as it was when it first started.      28-year-old gentleman presents today for initial neurologic consultation regarding abnormal muscle twitching.  He says it started about mid October and he noticed it in his thumb to begin right sided.  He then made the mistake of googling and saw ALS.  This really increased his anxiety.  He then started to notice twitching everywhere his calves his thighs his shoulders his foot.  He said he became obsessed with tracking it.  About 70% of it was in his legs and most of that in the calves lesser degree in the thighs.  He has seen it everywhere.  Stress, caffeine, lack of sleep make it worse.  Controlling dosings makes it better.  No focal weakness.  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  No difficulty swallowing.  No difficulty chewing.  No diplopia.  Family history of neurologic disorder is great grandfather had Parkinson's in his 80s.    Record review finds emergency department visit November 2023 where patient reported over the past week diffuse muscle twitching involving his right thumb and his bilateral thighs with increasing anxiety driving him to have suicidal ideation.  He was noted to have generalized anxiety disorder.  His neurologic examination was nonfocal.  He was said to have normal reflexes not suggestive of

## 2024-03-21 NOTE — PATIENT INSTRUCTIONS
[place], [place] Neuroscience Test Result Communication    Test results are available in [employer].  [employer] is the patient portal into our electronic health record.  This feature allows patients to see diagnostic test results, immunizations, allergies, past medical and surgical history, current medications, and send messages directly to providers.  Our team members at the  can provide additional information and assist with registration.  The [employer] support team can be reached at [phone].    In some cases, a provider might need time to explain the results in detail during a follow-up appointment.  This might include additional information or context that will help patients understand the reason for next steps in the plan of care recommended by their provider.    If a patient chooses to receive diagnostic testing at an imaging center outside of the [facility] network, it is the patient's responsibility to bring the imaging report and disc to their [facility] follow-up appointment.    If the test results reveal anything that is particularly noteworthy, we will contact you to discuss the matter and, if necessary, schedule a follow-up appointment at an earlier date.    If you have not received your test results by [employer] or other communication within 7 days, please contact our office.  An inquiry can be sent to your provider using [employer].  Alternatively, appointments can be scheduled via telephone to review results.  If a follow-up appointment to review results has not been scheduled, Our [facility] office can be reached at [phone].  For appointments at our [place] or [place] office, please call [phone].       PRESCRIPTION REFILL POLICY    [facility]   Statement to Patients  April 1, 2014      In an effort to ensure the large volume of patient prescription refills is processed in the most efficient and expeditious

## 2024-03-28 ENCOUNTER — HOSPITAL ENCOUNTER (OUTPATIENT)
Facility: HOSPITAL | Age: 29
Discharge: HOME | End: 2024-03-28
Attending: PSYCHIATRY & NEUROLOGY
Payer: COMMERCIAL

## 2024-03-28 DIAGNOSIS — R29.2 ABNORMAL DTR (DEEP TENDON REFLEX): ICD-10-CM

## 2024-03-28 DIAGNOSIS — R25.3 FASCICULATIONS: ICD-10-CM

## 2024-03-28 PROCEDURE — 72146 MRI CHEST SPINE W/O DYE: CPT

## 2024-04-03 ENCOUNTER — PROCEDURE VISIT (OUTPATIENT)
Age: 29
End: 2024-04-03
Payer: COMMERCIAL

## 2024-04-03 DIAGNOSIS — R25.3 FASCICULATIONS: Primary | ICD-10-CM

## 2024-04-03 PROCEDURE — 95911 NRV CNDJ TEST 9-10 STUDIES: CPT | Performed by: PSYCHIATRY & NEUROLOGY

## 2024-04-03 PROCEDURE — 95886 MUSC TEST DONE W/N TEST COMP: CPT | Performed by: PSYCHIATRY & NEUROLOGY

## 2024-04-03 NOTE — PROGRESS NOTES
EMG/ NCS Report  [facility] [address], [address]  [place]  [address]   Ph: [phone]   FAX: [phone]/ [phone]    Test Date:  4/3/2024    Patient: REJI SAMUELS : 1995 Physician: Jesus Hart MD   ID#: [id number] SEX: Male Ref. Phys: Suyapa Dee MD   Tech:    Patient History / Exam:  Patient complaining of intermittent widespread muscle twitching since 10/2023. Exam is non-focal. Assess for motor neuron disease or myopathy.     EMG & NCV Findings:  Sensory and motor nerve conduction studies (as indicated in the tables) were within reference of normal.    All F Wave latencies and H reflex were within normal limits.      Disposable concentric needle EMG (as indicated in the table) showed no evidence of electrical instability.      Impressions:   This study is normal.  There is no electrodiagnostic evidence of an entrapment neuropathy, generalized neuropathy, myopathy, motor neuron disorder, cervical or lumbar radiculopathy.     Thank you for the consult.     Jesus Hart MD      Nerve Conduction Studies  Anti Sensory Summary Table     Stim Site NR Peak (ms) Norm Peak (ms) P-T Amp (µV) Norm P-T Amp Site1 Site2 Dist (cm)   Right Median Anti Sensory (2nd Digit)  30.6 °C   Wrist    3.3 <4 88.4 >13 Wrist 2nd Digit 14.0   Right Radial Anti Sensory (Base 1st Digit)  31.1 °C   Wrist    1.9 <2.8 94.7 >11 Wrist Base 1st Digit 10.0   Right Sup Fibular Anti Sensory (Lat ankle)  29.1 °C   Lower leg    2.6 <4.5 19.1 >5 Lower leg Lat ankle 10.0   Site 2    2.6  20.1       Right Sural Anti Sensory (Lat Mall)  28.8 °C   Calf    3.3 <4.5 12.3 >4.0 Calf Lat Mall 14.0   Site 2    3.4  14.4       Right Ulnar Anti Sensory (5th Digit)  31 °C   Wrist    3.3 <4.0 54.9 >9 Wrist 5th Digit 14.0     Motor Summary Table     Stim Site NR Onset (ms) Norm Onset (ms) O-P Amp (mV) Norm O-P Amp Amp (Prev) (%) Site1 Site2 Dist (cm) Froy (m/s) Norm Froy (m/s)   Right

## 2024-04-23 ENCOUNTER — TELEPHONE (OUTPATIENT)
Age: 29
End: 2024-04-23

## 2024-04-23 NOTE — TELEPHONE ENCOUNTER
Mya is calling to verify our office received a fax for all medical records release from April of 2022 to present?       Mya is going to refax just in case today as well.

## 2024-06-12 ENCOUNTER — OFFICE VISIT (OUTPATIENT)
Age: 29
End: 2024-06-12
Payer: COMMERCIAL

## 2024-06-12 VITALS
OXYGEN SATURATION: 97 % | HEIGHT: 68 IN | DIASTOLIC BLOOD PRESSURE: 92 MMHG | HEART RATE: 77 BPM | BODY MASS INDEX: 22.73 KG/M2 | SYSTOLIC BLOOD PRESSURE: 132 MMHG | WEIGHT: 150 LBS | RESPIRATION RATE: 14 BRPM

## 2024-06-12 DIAGNOSIS — R25.3 FASCICULATIONS: Primary | ICD-10-CM

## 2024-06-12 PROCEDURE — 99213 OFFICE O/P EST LOW 20 MIN: CPT

## 2024-06-12 RX ORDER — LORATADINE 10 MG/1
10 TABLET ORAL DAILY
COMMUNITY

## 2024-06-12 NOTE — PROGRESS NOTES
standard drinks of alcohol     Types: 2 Glasses of wine per week    Drug use: Not Currently    Sexual activity: Yes     Partners: Female     Social Determinants of Health     Transportation Needs: Patient Declined (11/14/2023)    PRAPARE - Transportation     Lack of Transportation (Medical): Patient declined     Lack of Transportation (Non-Medical): Patient declined   Housing Stability: Unknown (11/14/2023)    Housing Stability Vital Sign     Unable to Pay for Housing in the Last Year: Patient refused     Number of Places Lived in the Last Year: 1     Unstable Housing in the Last Year: Patient refused       Review of Systems   Constitutional: Negative.    Musculoskeletal:         Muscle twitching   Psychiatric/Behavioral:  The patient is nervous/anxious.          Remainder of comprehensive review is negative.     Physical Exam :    BP (!) 132/92 (Site: Left Upper Arm, Position: Sitting)   Pulse 77   Resp 14   Ht 1.727 m (5' 8\")   Wt 68 kg (150 lb)   SpO2 97%   BMI 22.81 kg/m²     General: Well defined, nourished, and groomed individual in no acute distress.    Neck: Supple, nontender, no bruits, no pain with resistance to active range of motion.    Musculoskeletal: Extremities revealed no edema and had full range of motion of joints.    Psych: Anxious mood and bright affect      Orders Placed This Encounter    loratadine (CLARITIN) 10 MG tablet     Sig: Take 1 tablet by mouth daily        1. Fasciculations      Patient will continue to use the over-the-counter medications NICHOLAS and magnesium which he feels have been helping to lessen his fasciculations.  He will follow-up with neurology on an as-needed basis going forward.  Return if symptoms worsen or fail to improve.

## 2024-08-22 NOTE — PATIENT INSTRUCTIONS
RN called the patient [phone].  He is aware that Bree is back in clinic today and he is seeing her today at 1:15 PM     Adriana Bob RN on 8/22/2024 at 8:30 AM     Thank you for choosing [address] [place] [address]. Continue to take your Buspar as you have been taking. Prescription sent in to your pharmacy for hydroxyzine. Follow up with your PCP for your symptoms of twitching and anxiety.